# Patient Record
Sex: MALE | Race: WHITE | Employment: OTHER | URBAN - METROPOLITAN AREA
[De-identification: names, ages, dates, MRNs, and addresses within clinical notes are randomized per-mention and may not be internally consistent; named-entity substitution may affect disease eponyms.]

---

## 2018-12-20 ENCOUNTER — TELEPHONE (OUTPATIENT)
Dept: INFECTIOUS DISEASES | Age: 83
End: 2018-12-20

## 2023-02-15 PROBLEM — D64.9 ANEMIA: Status: ACTIVE | Noted: 2023-02-15

## 2023-02-15 PROBLEM — R39.9 UTI SYMPTOMS: Status: ACTIVE | Noted: 2023-02-15

## 2023-02-15 PROBLEM — J20.9 ACUTE BRONCHITIS: Status: ACTIVE | Noted: 2023-02-15

## 2023-02-15 PROBLEM — N52.9 ERECTILE DYSFUNCTION: Status: ACTIVE | Noted: 2023-02-15

## 2023-02-15 PROBLEM — I10 ESSENTIAL HYPERTENSION: Status: ACTIVE | Noted: 2023-02-15

## 2023-02-15 PROBLEM — L23.7 ALLERGIC DERMATITIS DUE TO POISON IVY: Status: ACTIVE | Noted: 2023-02-15

## 2023-02-15 PROBLEM — F43.21 GRIEVING: Status: ACTIVE | Noted: 2023-02-15

## 2023-02-15 PROBLEM — C44.90 SKIN CANCER: Status: ACTIVE | Noted: 2023-02-15

## 2023-02-15 PROBLEM — R21 SKIN RASH: Status: ACTIVE | Noted: 2023-02-15

## 2023-02-15 PROBLEM — L29.9 PRURITUS: Status: ACTIVE | Noted: 2023-02-15

## 2023-02-15 PROBLEM — R53.83 EXHAUSTION: Status: ACTIVE | Noted: 2023-02-15

## 2023-02-15 PROBLEM — R73.03 PREDIABETES: Status: ACTIVE | Noted: 2023-02-15

## 2023-02-15 PROBLEM — R05.9 COUGH: Status: ACTIVE | Noted: 2023-02-15

## 2023-02-15 PROBLEM — I65.22 ARTERIOSCLEROSIS OF CAROTID ARTERY, LEFT: Status: ACTIVE | Noted: 2023-02-15

## 2023-02-15 PROBLEM — I65.22 OCCLUSION AND STENOSIS OF LEFT CAROTID ARTERY: Status: ACTIVE | Noted: 2023-02-15

## 2023-02-15 PROBLEM — E87.5 HYPERKALEMIA: Status: ACTIVE | Noted: 2023-02-15

## 2023-02-15 PROBLEM — E78.5 HYPERLIPIDEMIA: Status: ACTIVE | Noted: 2023-02-15

## 2023-02-15 PROBLEM — U07.1 COVID-19: Status: ACTIVE | Noted: 2023-02-15

## 2023-02-15 PROBLEM — D69.6 THROMBOCYTOPENIA (CMS-HCC): Status: ACTIVE | Noted: 2023-02-15

## 2023-02-15 RX ORDER — HYDROXYZINE HYDROCHLORIDE 10 MG/1
1 TABLET, FILM COATED ORAL 2 TIMES DAILY PRN
COMMUNITY

## 2023-02-15 RX ORDER — CLOPIDOGREL BISULFATE 75 MG/1
1 TABLET ORAL DAILY
COMMUNITY
Start: 2019-05-24 | End: 2023-08-01 | Stop reason: SDUPTHER

## 2023-02-15 RX ORDER — ATORVASTATIN CALCIUM 20 MG/1
1 TABLET, FILM COATED ORAL NIGHTLY
COMMUNITY
Start: 2020-08-28 | End: 2023-10-25 | Stop reason: SDUPTHER

## 2023-02-15 RX ORDER — TRIAMCINOLONE ACETONIDE 1 MG/G
CREAM TOPICAL 2 TIMES DAILY
COMMUNITY

## 2023-02-15 RX ORDER — LISINOPRIL AND HYDROCHLOROTHIAZIDE 12.5; 2 MG/1; MG/1
1 TABLET ORAL DAILY
COMMUNITY
End: 2023-10-27 | Stop reason: SDUPTHER

## 2023-02-15 RX ORDER — NIFEDIPINE 60 MG/1
1 TABLET, FILM COATED, EXTENDED RELEASE ORAL DAILY
COMMUNITY
End: 2023-10-25 | Stop reason: SDUPTHER

## 2023-02-15 RX ORDER — SILDENAFIL 50 MG/1
1 TABLET, FILM COATED ORAL DAILY
COMMUNITY
Start: 2021-10-19

## 2023-03-23 LAB
ALANINE AMINOTRANSFERASE (SGPT) (U/L) IN SER/PLAS: 12 U/L (ref 10–52)
ALBUMIN (G/DL) IN SER/PLAS: 3.9 G/DL (ref 3.4–5)
ALKALINE PHOSPHATASE (U/L) IN SER/PLAS: 171 U/L (ref 33–136)
ANION GAP IN SER/PLAS: 12 MMOL/L (ref 10–20)
ASPARTATE AMINOTRANSFERASE (SGOT) (U/L) IN SER/PLAS: 18 U/L (ref 9–39)
BASOPHILS (10*3/UL) IN BLOOD BY AUTOMATED COUNT: 0.05 X10E9/L (ref 0–0.1)
BASOPHILS/100 LEUKOCYTES IN BLOOD BY AUTOMATED COUNT: 0.5 % (ref 0–2)
BILIRUBIN TOTAL (MG/DL) IN SER/PLAS: 0.5 MG/DL (ref 0–1.2)
CALCIUM (MG/DL) IN SER/PLAS: 9.1 MG/DL (ref 8.6–10.3)
CARBON DIOXIDE, TOTAL (MMOL/L) IN SER/PLAS: 25 MMOL/L (ref 21–32)
CHLORIDE (MMOL/L) IN SER/PLAS: 105 MMOL/L (ref 98–107)
CHOLESTEROL (MG/DL) IN SER/PLAS: 169 MG/DL (ref 0–199)
CHOLESTEROL IN HDL (MG/DL) IN SER/PLAS: 33.9 MG/DL
CHOLESTEROL IN LDL (MG/DL) IN SER/PLAS BY DIRECT ASSAY: 122 MG/DL (ref 0–129)
CHOLESTEROL/HDL RATIO: 5
CREATININE (MG/DL) IN SER/PLAS: 1.32 MG/DL (ref 0.5–1.3)
EOSINOPHILS (10*3/UL) IN BLOOD BY AUTOMATED COUNT: 0.64 X10E9/L (ref 0–0.4)
EOSINOPHILS/100 LEUKOCYTES IN BLOOD BY AUTOMATED COUNT: 7 % (ref 0–6)
ERYTHROCYTE DISTRIBUTION WIDTH (RATIO) BY AUTOMATED COUNT: 14.9 % (ref 11.5–14.5)
ERYTHROCYTE MEAN CORPUSCULAR HEMOGLOBIN CONCENTRATION (G/DL) BY AUTOMATED: 31.5 G/DL (ref 32–36)
ERYTHROCYTE MEAN CORPUSCULAR VOLUME (FL) BY AUTOMATED COUNT: 85 FL (ref 80–100)
ERYTHROCYTES (10*6/UL) IN BLOOD BY AUTOMATED COUNT: 5.05 X10E12/L (ref 4.5–5.9)
GFR MALE: 52 ML/MIN/1.73M2
GLUCOSE (MG/DL) IN SER/PLAS: 112 MG/DL (ref 74–99)
HEMATOCRIT (%) IN BLOOD BY AUTOMATED COUNT: 42.9 % (ref 41–52)
HEMOGLOBIN (G/DL) IN BLOOD: 13.5 G/DL (ref 13.5–17.5)
IMMATURE GRANULOCYTES/100 LEUKOCYTES IN BLOOD BY AUTOMATED COUNT: 0.4 % (ref 0–0.9)
LDL: 107 MG/DL (ref 0–99)
LEUKOCYTES (10*3/UL) IN BLOOD BY AUTOMATED COUNT: 9.1 X10E9/L (ref 4.4–11.3)
LYMPHOCYTES (10*3/UL) IN BLOOD BY AUTOMATED COUNT: 1.29 X10E9/L (ref 0.8–3)
LYMPHOCYTES/100 LEUKOCYTES IN BLOOD BY AUTOMATED COUNT: 14.1 % (ref 13–44)
MONOCYTES (10*3/UL) IN BLOOD BY AUTOMATED COUNT: 0.96 X10E9/L (ref 0.05–0.8)
MONOCYTES/100 LEUKOCYTES IN BLOOD BY AUTOMATED COUNT: 10.5 % (ref 2–10)
NEUTROPHILS (10*3/UL) IN BLOOD BY AUTOMATED COUNT: 6.15 X10E9/L (ref 1.6–5.5)
NEUTROPHILS/100 LEUKOCYTES IN BLOOD BY AUTOMATED COUNT: 67.5 % (ref 40–80)
PLATELETS (10*3/UL) IN BLOOD AUTOMATED COUNT: 223 X10E9/L (ref 150–450)
POTASSIUM (MMOL/L) IN SER/PLAS: 4.4 MMOL/L (ref 3.5–5.3)
PROTEIN TOTAL: 6.6 G/DL (ref 6.4–8.2)
SODIUM (MMOL/L) IN SER/PLAS: 138 MMOL/L (ref 136–145)
TRIGLYCERIDE (MG/DL) IN SER/PLAS: 142 MG/DL (ref 0–149)
UREA NITROGEN (MG/DL) IN SER/PLAS: 21 MG/DL (ref 6–23)
VLDL: 28 MG/DL (ref 0–40)

## 2023-03-29 ENCOUNTER — OFFICE VISIT (OUTPATIENT)
Dept: PRIMARY CARE | Facility: CLINIC | Age: 88
End: 2023-03-29
Payer: MEDICARE

## 2023-03-29 VITALS
HEIGHT: 67 IN | HEART RATE: 68 BPM | WEIGHT: 202 LBS | TEMPERATURE: 98.1 F | BODY MASS INDEX: 31.71 KG/M2 | SYSTOLIC BLOOD PRESSURE: 128 MMHG | DIASTOLIC BLOOD PRESSURE: 60 MMHG

## 2023-03-29 DIAGNOSIS — E78.2 MIXED HYPERLIPIDEMIA: ICD-10-CM

## 2023-03-29 DIAGNOSIS — D69.6 THROMBOCYTOPENIA (CMS-HCC): ICD-10-CM

## 2023-03-29 DIAGNOSIS — I65.22 ARTERIOSCLEROSIS OF CAROTID ARTERY, LEFT: ICD-10-CM

## 2023-03-29 DIAGNOSIS — I10 ESSENTIAL HYPERTENSION: Primary | ICD-10-CM

## 2023-03-29 DIAGNOSIS — D64.9 ANEMIA, UNSPECIFIED TYPE: ICD-10-CM

## 2023-03-29 PROBLEM — I87.2 STASIS DERMATITIS OF BOTH LEGS: Status: ACTIVE | Noted: 2023-03-29

## 2023-03-29 PROBLEM — L23.7 ALLERGIC DERMATITIS DUE TO POISON IVY: Status: RESOLVED | Noted: 2023-02-15 | Resolved: 2023-03-29

## 2023-03-29 PROCEDURE — 99213 OFFICE O/P EST LOW 20 MIN: CPT | Performed by: INTERNAL MEDICINE

## 2023-03-29 PROCEDURE — 3074F SYST BP LT 130 MM HG: CPT | Performed by: INTERNAL MEDICINE

## 2023-03-29 PROCEDURE — 1159F MED LIST DOCD IN RCRD: CPT | Performed by: INTERNAL MEDICINE

## 2023-03-29 PROCEDURE — 1160F RVW MEDS BY RX/DR IN RCRD: CPT | Performed by: INTERNAL MEDICINE

## 2023-03-29 PROCEDURE — 1157F ADVNC CARE PLAN IN RCRD: CPT | Performed by: INTERNAL MEDICINE

## 2023-03-29 PROCEDURE — 1036F TOBACCO NON-USER: CPT | Performed by: INTERNAL MEDICINE

## 2023-03-29 PROCEDURE — 3078F DIAST BP <80 MM HG: CPT | Performed by: INTERNAL MEDICINE

## 2023-03-29 ASSESSMENT — PATIENT HEALTH QUESTIONNAIRE - PHQ9
1. LITTLE INTEREST OR PLEASURE IN DOING THINGS: NOT AT ALL
2. FEELING DOWN, DEPRESSED OR HOPELESS: NOT AT ALL
SUM OF ALL RESPONSES TO PHQ9 QUESTIONS 1 AND 2: 0

## 2023-03-29 ASSESSMENT — ENCOUNTER SYMPTOMS
CARDIOVASCULAR NEGATIVE: 1
LOSS OF SENSATION IN FEET: 0
CONSTITUTIONAL NEGATIVE: 1
RESPIRATORY NEGATIVE: 1
PSYCHIATRIC NEGATIVE: 1
OCCASIONAL FEELINGS OF UNSTEADINESS: 0
HEMATOLOGIC/LYMPHATIC NEGATIVE: 1
NEUROLOGICAL NEGATIVE: 1
DEPRESSION: 0

## 2023-03-29 NOTE — PROGRESS NOTES
"Subjective   Patient ID: Jonathan Lira is a 88 y.o. male who presents for Follow-up (Patient here for office visit and lab results).    Patient here for follow-up his redness in his legs is improved with topical steroids.  He does have stasis dermatitis from chronic venous stasis.         Review of Systems   Constitutional: Negative.    HENT: Negative.     Respiratory: Negative.     Cardiovascular: Negative.    Genitourinary: Negative.    Neurological: Negative.    Hematological: Negative.    Psychiatric/Behavioral: Negative.     All other systems reviewed and are negative.      Objective   /60   Pulse 68   Temp 36.7 °C (98.1 °F) (Temporal)   Ht 1.702 m (5' 7\")   Wt 91.6 kg (202 lb)   BMI 31.64 kg/m²     Physical Exam  Vitals reviewed.   Constitutional:       Appearance: Normal appearance.   HENT:      Head: Normocephalic and atraumatic.   Eyes:      Conjunctiva/sclera: Conjunctivae normal.      Pupils: Pupils are equal, round, and reactive to light.   Cardiovascular:      Rate and Rhythm: Normal rate and regular rhythm.      Heart sounds: Normal heart sounds.   Pulmonary:      Effort: Pulmonary effort is normal.      Breath sounds: Normal breath sounds.   Abdominal:      Palpations: Abdomen is soft.   Musculoskeletal:      Right lower leg: No edema.   Skin:     Findings: Erythema and rash present.   Neurological:      General: No focal deficit present.      Mental Status: He is alert. Mental status is at baseline.   Psychiatric:         Mood and Affect: Mood normal.         Thought Content: Thought content normal.         Assessment/Plan   Problem List Items Addressed This Visit          Circulatory    Arteriosclerosis of carotid artery, left    Essential hypertension - Primary     Continue DASH diet.  Patient physically very active.  He will continue lisinopril hydrochlorothiazide and nifedipine for hypertension.            Hematologic    Anemia    Thrombocytopenia (CMS/[facility])     Platelet count will be " closely monitored he does not have any bleeding or bruising issues.            Other    Hyperlipidemia     Patient will continue lisinopril hydrochlorothiazide and nifedipine for hypertension follow DASH diet.  Increase physical activity even though he is very active even at age 88

## 2023-03-30 NOTE — ASSESSMENT & PLAN NOTE
Patient will continue lisinopril hydrochlorothiazide and nifedipine for hypertension follow DASH diet.  Increase physical activity even though he is very active even at age 88

## 2023-03-30 NOTE — ASSESSMENT & PLAN NOTE
Continue DASH diet.  Patient physically very active.  He will continue lisinopril hydrochlorothiazide and nifedipine for hypertension.

## 2023-05-22 ENCOUNTER — APPOINTMENT (OUTPATIENT)
Dept: PRIMARY CARE | Facility: CLINIC | Age: 88
End: 2023-05-22
Payer: MEDICARE

## 2023-07-06 ENCOUNTER — OFFICE VISIT (OUTPATIENT)
Dept: PRIMARY CARE | Facility: CLINIC | Age: 88
End: 2023-07-06
Payer: MEDICARE

## 2023-07-06 VITALS
BODY MASS INDEX: 28.28 KG/M2 | WEIGHT: 202 LBS | HEART RATE: 64 BPM | TEMPERATURE: 98.3 F | DIASTOLIC BLOOD PRESSURE: 80 MMHG | HEIGHT: 71 IN | SYSTOLIC BLOOD PRESSURE: 146 MMHG

## 2023-07-06 DIAGNOSIS — C44.309 SKIN CANCER OF FOREHEAD: ICD-10-CM

## 2023-07-06 DIAGNOSIS — I65.22 ARTERIOSCLEROSIS OF CAROTID ARTERY, LEFT: ICD-10-CM

## 2023-07-06 DIAGNOSIS — I10 ESSENTIAL HYPERTENSION: Primary | ICD-10-CM

## 2023-07-06 DIAGNOSIS — E78.2 MIXED HYPERLIPIDEMIA: ICD-10-CM

## 2023-07-06 PROCEDURE — 3079F DIAST BP 80-89 MM HG: CPT | Performed by: INTERNAL MEDICINE

## 2023-07-06 PROCEDURE — 1036F TOBACCO NON-USER: CPT | Performed by: INTERNAL MEDICINE

## 2023-07-06 PROCEDURE — 1159F MED LIST DOCD IN RCRD: CPT | Performed by: INTERNAL MEDICINE

## 2023-07-06 PROCEDURE — 99213 OFFICE O/P EST LOW 20 MIN: CPT | Performed by: INTERNAL MEDICINE

## 2023-07-06 PROCEDURE — 3077F SYST BP >= 140 MM HG: CPT | Performed by: INTERNAL MEDICINE

## 2023-07-06 PROCEDURE — 1157F ADVNC CARE PLAN IN RCRD: CPT | Performed by: INTERNAL MEDICINE

## 2023-07-06 PROCEDURE — 1160F RVW MEDS BY RX/DR IN RCRD: CPT | Performed by: INTERNAL MEDICINE

## 2023-07-06 ASSESSMENT — ENCOUNTER SYMPTOMS
PSYCHIATRIC NEGATIVE: 1
RESPIRATORY NEGATIVE: 1
CARDIOVASCULAR NEGATIVE: 1
ENDOCRINE NEGATIVE: 1
MUSCULOSKELETAL NEGATIVE: 1
NEUROLOGICAL NEGATIVE: 1
HEMATOLOGIC/LYMPHATIC NEGATIVE: 1
EYES NEGATIVE: 1

## 2023-07-06 NOTE — PROGRESS NOTES
"Subjective   Patient ID: Jonathan Lira is a 88 y.o. male who presents for Follow-up (Pt here for OV).    HPI patient here for office visit he has history of hypertension and carotid stenosis he has history of stroke he is on Plavix and Lipitor he is feeling well and is not experiencing any active symptoms or complaints    Review of Systems   HENT: Negative.     Eyes: Negative.    Respiratory: Negative.     Cardiovascular: Negative.    Endocrine: Negative.    Genitourinary: Negative.    Musculoskeletal: Negative.    Neurological: Negative.    Hematological: Negative.    Psychiatric/Behavioral: Negative.     All other systems reviewed and are negative.      Objective   /80   Pulse 64   Temp 36.8 °C (98.3 °F) (Temporal)   Ht 1.803 m (5' 11\")   Wt 91.6 kg (202 lb)   BMI 28.17 kg/m²     Physical Exam  Vitals reviewed.   Constitutional:       Appearance: Normal appearance.   Eyes:      Conjunctiva/sclera: Conjunctivae normal.   Cardiovascular:      Rate and Rhythm: Normal rate and regular rhythm.      Heart sounds: Normal heart sounds.   Abdominal:      Palpations: Abdomen is soft.   Neurological:      Mental Status: He is alert.   Psychiatric:         Mood and Affect: Mood normal.         Behavior: Behavior normal.         Thought Content: Thought content normal.       Assessment/Plan   Problem List Items Addressed This Visit       Arteriosclerosis of carotid artery, left    Essential hypertension - Primary    Hyperlipidemia    Skin cancer of forehead        Patient will continue to follow-up with the dermatology he has history of skin cancer and Mohs surgery for back on more than one occasion he is on 5-fluorouracil topical cream.  He will continue lisinopril hydrochlorothiazide and nifedipine for hypertension he has history of hyperlipidemia continue statins he has history of prediabetes health continue lifestyle modification and diet  "

## 2023-07-12 ENCOUNTER — NURSING HOME VISIT (OUTPATIENT)
Dept: POST ACUTE CARE | Facility: EXTERNAL LOCATION | Age: 88
End: 2023-07-12
Payer: MEDICARE

## 2023-07-12 DIAGNOSIS — I65.22 OCCLUSION AND STENOSIS OF LEFT CAROTID ARTERY: ICD-10-CM

## 2023-07-12 DIAGNOSIS — R47.9 SPEECH DISTURBANCE, UNSPECIFIED TYPE: ICD-10-CM

## 2023-07-12 DIAGNOSIS — I10 ESSENTIAL HYPERTENSION: Primary | ICD-10-CM

## 2023-07-12 DIAGNOSIS — E78.2 MIXED HYPERLIPIDEMIA: ICD-10-CM

## 2023-07-12 DIAGNOSIS — G89.29 CHRONIC PAIN OF RIGHT KNEE: ICD-10-CM

## 2023-07-12 DIAGNOSIS — M25.561 CHRONIC PAIN OF RIGHT KNEE: ICD-10-CM

## 2023-07-12 PROCEDURE — 99306 1ST NF CARE HIGH MDM 50: CPT | Performed by: INTERNAL MEDICINE

## 2023-07-12 NOTE — LETTER
Patient: Jonathan Lira  : 1934    Encounter Date: 2023    Subjective  Patient ID: Jonathan Lira is a 88 y.o. male who is acute skilled care and presents for initial visit for skilled nursing.    88-year-old male patient was admitted because he was having transient speech disturbance, he was admitted because of strokelike symptoms as they do it normally nowadays through emergency room.  Work-up was negative, MRI was negative, previously it has been documented that patient has a some disease in the left carotid artery system.  It is also seen that patient has a some neurodegenerative disorder or parkinsonism features.  Patient was kept in the hospital, MRI was done, nothing was obvious, admitted here for skilled nursing and rehabilitation.  Patient was assessed on the day of assessment and evaluation.  He was sitting comfortably, overnight was uneventful, patient has ambulatory care records and previous evaluations and reports were reviewed.  He has excellent family support, patient will require skilled nursing and rehabilitation.         Review of Systems   Constitutional:  Positive for activity change and fatigue. Negative for diaphoresis.   HENT: Negative.  Negative for congestion and voice change.    Eyes: Negative.  Negative for visual disturbance.   Respiratory: Negative.     Cardiovascular: Negative.  Negative for chest pain, palpitations and leg swelling.   Gastrointestinal: Negative.  Negative for abdominal distention, constipation and diarrhea.   Genitourinary:  Negative for difficulty urinating and hematuria.   Musculoskeletal:  Positive for arthralgias, gait problem and joint swelling.   Skin: Negative.    Neurological:  Positive for speech difficulty and weakness. Negative for dizziness and tremors.   Psychiatric/Behavioral:  Negative for agitation, hallucinations and sleep disturbance.        Objective  /79   Pulse 88     Physical Exam  Constitutional:       Appearance: Normal appearance.  He is normal weight.   HENT:      Head: Normocephalic.      Nose: Nose normal.   Eyes:      Conjunctiva/sclera: Conjunctivae normal.   Cardiovascular:      Rate and Rhythm: Normal rate and regular rhythm.      Pulses: Normal pulses.      Heart sounds: Normal heart sounds.   Pulmonary:      Effort: Pulmonary effort is normal.      Breath sounds: Normal breath sounds.   Abdominal:      General: Abdomen is flat.      Palpations: Abdomen is soft.   Musculoskeletal:         General: Swelling, tenderness and deformity present.      Cervical back: Neck supple.   Skin:     General: Skin is warm and dry.   Neurological:      Mental Status: He is oriented to person, place, and time. Mental status is at baseline.   Psychiatric:         Mood and Affect: Mood normal.         Judgment: Judgment normal.         Assessment/Plan  Problem List Items Addressed This Visit       Essential hypertension - Primary    Hyperlipidemia    Occlusion and stenosis of left carotid artery    Speech disturbance    Chronic pain of right knee   Patient is listed to be on atorvastatin, lisinopril, Nifedical, clopidogrel.  He has been having mild symptoms of cough congestion, he is able to communicate properly, he is able to answer my questions, his speech is not completely normal but it has some substance and fluency.  There is no obvious neurological deficits found.  Remains on scant therapeutics and also only on clopidogrel.  He looks well, physical therapy, Occupational Therapy evaluation will be done, routine care precautions, fall precautions, fulfillment of patient's needs and requirement on a periodic basis and also proper support and assistance will be provided.  Patient is looking well, no obvious evidence of neurological deficit or cognitive impairment, BP readings will be monitored, laboratories will be done as per our routine, at the discharge patient's laboratories were reviewed, discussed with nursing staff and periodic follow-up will be  done.     Goals    None           Electronically Signed By: Scott Junior MD   7/16/23  1:57 PM

## 2023-07-14 ENCOUNTER — NURSING HOME VISIT (OUTPATIENT)
Dept: POST ACUTE CARE | Facility: EXTERNAL LOCATION | Age: 88
End: 2023-07-14
Payer: MEDICARE

## 2023-07-14 VITALS
HEIGHT: 71 IN | RESPIRATION RATE: 18 BRPM | DIASTOLIC BLOOD PRESSURE: 78 MMHG | WEIGHT: 199 LBS | HEART RATE: 78 BPM | OXYGEN SATURATION: 96 % | BODY MASS INDEX: 27.86 KG/M2 | TEMPERATURE: 97.6 F | SYSTOLIC BLOOD PRESSURE: 138 MMHG

## 2023-07-14 DIAGNOSIS — I10 ESSENTIAL HYPERTENSION: ICD-10-CM

## 2023-07-14 DIAGNOSIS — U07.1 COVID-19: Primary | ICD-10-CM

## 2023-07-14 DIAGNOSIS — R47.81 SLURRED SPEECH: ICD-10-CM

## 2023-07-14 DIAGNOSIS — G89.29 CHRONIC PAIN OF RIGHT KNEE: ICD-10-CM

## 2023-07-14 DIAGNOSIS — Z74.09 IMPAIRED FUNCTIONAL MOBILITY, BALANCE, GAIT, AND ENDURANCE: ICD-10-CM

## 2023-07-14 DIAGNOSIS — M25.561 CHRONIC PAIN OF RIGHT KNEE: ICD-10-CM

## 2023-07-14 PROCEDURE — 99310 SBSQ NF CARE HIGH MDM 45: CPT | Performed by: PHYSICIAN ASSISTANT

## 2023-07-14 NOTE — LETTER
Patient: Jonathan Lira  : 1934    Encounter Date: 2023  Name: Jonathan Lira  YOB: 1934    Chief complaint: Progressive weakness. Inability to ambulate.    HPI: This is a 88 year old  male who has a medical history remarkable for CVA, remote history for Parkinson's, generalized anxiety disorder, gout, diabetes, hyperlipidemia, carotid artery stenosis, stage 3 CKD, and DVT. Patient was brought to the ER for evaluation of episode of slurred speech that  occurred 4 days earlier, lasted approximately 45 minutes and resolved spontaneously. Patient did not seek evaluation at that time but because of progressive weakness and inability to stand, his son insisted he go to the ER for help. EKG showed sinus rhythm with RBBB. Lab work was significant for a WBC of 15.1. CT of brain negative acute intracranial abnormality or calvarial fracture. MRI showed no evidence of intercranial hemorrhage. No mass effect or mid line shift. Patient was mildly dehydrated which improved with hydration. He was seen by neurology service who felt patient could be seen in outpatient follow up. Patient was discharged to SNF for rehab.    Today patient seen in his room. COVID screen positive. He has been placed on quarantine. He also has R knee swelling. Ortho consult has been requested.      Extremity Weakness  This is a new problem. The current episode started in the past 7 days. The problem occurs daily. The problem has been gradually improving. Associated symptoms include arthralgias and joint swelling. Pertinent negatives include no anorexia, chest pain, chills, congestion, fever, headaches, numbness or urinary symptoms. The symptoms are aggravated by standing. He has tried acetaminophen and rest for the symptoms. The treatment provided mild relief.     Review of systems:   ROS negative except were noted in HPI.    Code Status: full code    /78   Pulse 78   Temp 36.4 °C (97.6 °F)   Resp 18    "Ht 1.803 m (5' 11\")   Wt 90.3 kg (199 lb)   SpO2 96%   BMI 27.75 kg/m²      Physical Exam  Constitutional:       General: He is not in acute distress.     Appearance: Normal appearance.   HENT:      Head: Normocephalic.      Nose: Nose normal. No congestion.      Mouth/Throat:      Mouth: Mucous membranes are moist.      Pharynx: Oropharynx is clear.   Eyes:      Extraocular Movements: Extraocular movements intact.      Pupils: Pupils are equal, round, and reactive to light.   Cardiovascular:      Rate and Rhythm: Normal rate and regular rhythm.      Pulses: Normal pulses.      Heart sounds: Normal heart sounds.   Pulmonary:      Effort: Pulmonary effort is normal.      Breath sounds: Normal breath sounds. No wheezing or rales.   Genitourinary:     Comments: Voiding.  Musculoskeletal:      Cervical back: Normal range of motion.      Comments: R knee swelling.    Lymphadenopathy:      Cervical: No cervical adenopathy.   Skin:     General: Skin is warm and dry.      Capillary Refill: Capillary refill takes less than 2 seconds.   Neurological:      General: No focal deficit present.      Mental Status: He is alert and oriented to person, place, and time.   Psychiatric:         Mood and Affect: Mood normal.         Behavior: Behavior normal.        Medications reviewed during visit at facility.  Lisinopril/hydrochlorothiazide 20/12.5 mg po daily  Plavix 75 mg po daily  Nifedical XL 60 mg po daily  Atorvastatin 20 mg po daily  Tylenol 650 mg po q 4 hours prn  MOM 30 ml po daily prn    Labs reviewed at facility:     Laboratory Service Report [id number]   Patient Name   JONA RICHMOND  Patient ID   [id number]  Age   88 Y  Gender   M  Order #      Ordering RAJ Lee  Patient Telephone #   N/A     1934  AKA      Client Order #   [id number]   Collection Date and Time   2023 07:55   Print Date and Time   2023 22:00  Account Information   [facility] [place]   [address] [place] [address]     Report Notes                        Test Results   Reference Perform  Unit  Value Site*             CBC and Differential  REPORTED 07/14/2023 11:12  White Blood Cell Count   9.98 k/uL 3.70-11.00    RBC   5.20 m/uL 4.20-6.00    Hemoglobin   13.5 g/dL 13.0-17.0    Hematocrit   42.2 % 39.0-51.0    MCV   81.2 fL 80.0-100.0    MCH   26.0 pg 26.0-34.0    MCHC   32.0 g/dL 30.5-36.0    RDW-CV   15.0 % 11.5-15.0    Platelet Count   227 k/uL 150-400    MPV   12.4 fL 9.0-12.7    Neut%   79.3 %    Abs Neut H 7.91 k/uL 1.45-7.50    Lymph%   8.6 %    Abs Lymph L 0.86 k/uL 1.00-4.00    Mono%   10.8 %    Abs Mono H 1.08 k/uL <0.87    Eosin%   0.4 %    Abs Eosin   0.04 k/uL <0.46    Baso%   0.2 %    Abs Baso   <0.03 k/uL <0.11    Immature Gran %%   0.7 %    Abs Immature Gran   0.07 k/uL <0.10    Absolute nRBC   0.0 /100 WBC    Absolute nRBC   <0.01 k/uL <0.01    Diff Type   Auto               Comp Metabolic Panel  REPORTED 07/14/2023 11:40  Protein, Total   6.7 g/dL 6.3-8.0    Albumin L 3.5 g/dL 3.9-4.9    Calcium, Total   9.2 mg/dL 8.5-10.2    Bilirubin, Total   0.7 mg/dL 0.2-1.3    Alkaline Phosphatase H 143 U/L     AST   21 U/L 14-40    ALT   13 U/L 10-54    Glucose   95 mg/dL 74-99  BUN H 38 mg/dL 9-24    Creatinine H 1.46 mg/dL 0.73-1.22    Sodium L 131 mmol/L 136-144    Potassium L 3.5 mmol/L 3.7-5.1    Chloride L 94 mmol/L     CO2 L 21 mmol/L 22-30    Anion Gap   16 mmol/L 9-18    Estimated Glomerular Filtration Rate L 46 mL/min/1.73 meters squared >=60   Assessment/Plan   Problem List Items Addressed This Visit       COVID-19 - Primary     Place in quarantine. Droplet precautions maintained during all encounters while in COVID isolation. All services to be provided in room. Monitor oxygen needs.         Essential hypertension     Review BP readings. Continue with Lisinopril/hydrochlorothiazide 20/12.5 mg po daily. Nifedical XL 60 mg po daily         Speech disturbance     Schedule  appointment with  Dr. Garsia - neuro - 8/14/23         Chronic pain of right knee     Schedule appointment with Dr. Shalini BENITEZ on 8-10-23.          Impaired functional mobility, balance, gait, and endurance     Order PT and OT to assess and treat.            Time:  I spent 45 minutes or greater with the patient. Greater than 50% of this time was spent in counseling and or coordination of care. The time includes prep time of reviewing vital signs, report from direct nursing staff and or therapists, hospital documentation, reviewing labs, radiographs, diagnostic tests and or consultations, time directly spent with the patient interviewing, examining, and education regarding diagnosis, treatments, and medications, as well as documentation in the electronic medical record, and reviewing the plan of care and any new orders with the patient, nursing staff and other staff directly related to the patients care.      Yogesh Perez PA-C       Electronically Signed By: Yogesh Perez PA-C   7/16/23  5:24 PM

## 2023-07-15 NOTE — PROGRESS NOTES
"7/14/2023  Name: Jonathan Lira  YOB: 1934    Chief complaint: Progressive weakness. Inability to ambulate.    HPI: This is a 88 year old  male who has a medical history remarkable for CVA, remote history for Parkinson's, generalized anxiety disorder, gout, diabetes, hyperlipidemia, carotid artery stenosis, stage 3 CKD, and DVT. Patient was brought to the ER for evaluation of episode of slurred speech that  occurred 4 days earlier, lasted approximately 45 minutes and resolved spontaneously. Patient did not seek evaluation at that time but because of progressive weakness and inability to stand, his son insisted he go to the ER for help. EKG showed sinus rhythm with RBBB. Lab work was significant for a WBC of 15.1. CT of brain negative acute intracranial abnormality or calvarial fracture. MRI showed no evidence of intercranial hemorrhage. No mass effect or mid line shift. Patient was mildly dehydrated which improved with hydration. He was seen by neurology service who felt patient could be seen in outpatient follow up. Patient was discharged to SNF for rehab.    Today patient seen in his room. COVID screen positive. He has been placed on quarantine. He also has R knee swelling. Ortho consult has been requested.      Extremity Weakness  This is a new problem. The current episode started in the past 7 days. The problem occurs daily. The problem has been gradually improving. Associated symptoms include arthralgias and joint swelling. Pertinent negatives include no anorexia, chest pain, chills, congestion, fever, headaches, numbness or urinary symptoms. The symptoms are aggravated by standing. He has tried acetaminophen and rest for the symptoms. The treatment provided mild relief.     Review of systems:   ROS negative except were noted in HPI.    Code Status: full code    /78   Pulse 78   Temp 36.4 °C (97.6 °F)   Resp 18   Ht 1.803 m (5' 11\")   Wt 90.3 kg (199 lb)   SpO2 96%   BMI 27.75 " kg/m²      Physical Exam  Constitutional:       General: He is not in acute distress.     Appearance: Normal appearance.   HENT:      Head: Normocephalic.      Nose: Nose normal. No congestion.      Mouth/Throat:      Mouth: Mucous membranes are moist.      Pharynx: Oropharynx is clear.   Eyes:      Extraocular Movements: Extraocular movements intact.      Pupils: Pupils are equal, round, and reactive to light.   Cardiovascular:      Rate and Rhythm: Normal rate and regular rhythm.      Pulses: Normal pulses.      Heart sounds: Normal heart sounds.   Pulmonary:      Effort: Pulmonary effort is normal.      Breath sounds: Normal breath sounds. No wheezing or rales.   Genitourinary:     Comments: Voiding.  Musculoskeletal:      Cervical back: Normal range of motion.      Comments: R knee swelling.    Lymphadenopathy:      Cervical: No cervical adenopathy.   Skin:     General: Skin is warm and dry.      Capillary Refill: Capillary refill takes less than 2 seconds.   Neurological:      General: No focal deficit present.      Mental Status: He is alert and oriented to person, place, and time.   Psychiatric:         Mood and Affect: Mood normal.         Behavior: Behavior normal.        Medications reviewed during visit at facility.  Lisinopril/hydrochlorothiazide 20/12.5 mg po daily  Plavix 75 mg po daily  Nifedical XL 60 mg po daily  Atorvastatin 20 mg po daily  Tylenol 650 mg po q 4 hours prn  MOM 30 ml po daily prn    Labs reviewed at facility:     Laboratory Service Report [id number]   Patient Name   JOAN RICHMOND  Patient ID   [id number]  Age   88 Y  Gender   M  Order #      Ordering Phys   RAJ ONTIVEROS  Patient Telephone #   N/A     1934  AKA      Client Order #   [id number]   Collection Date and Time   2023 07:55   Print Date and Time   2023 22:00  Account Information   [facility] [place]   [address] [place] [address]     Report Notes                         Test Results   Reference Perform  Unit  Value Site*             CBC and Differential  REPORTED 07/14/2023 11:12  White Blood Cell Count   9.98 k/uL 3.70-11.00    RBC   5.20 m/uL 4.20-6.00    Hemoglobin   13.5 g/dL 13.0-17.0    Hematocrit   42.2 % 39.0-51.0    MCV   81.2 fL 80.0-100.0    MCH   26.0 pg 26.0-34.0    MCHC   32.0 g/dL 30.5-36.0    RDW-CV   15.0 % 11.5-15.0    Platelet Count   227 k/uL 150-400    MPV   12.4 fL 9.0-12.7    Neut%   79.3 %    Abs Neut H 7.91 k/uL 1.45-7.50    Lymph%   8.6 %    Abs Lymph L 0.86 k/uL 1.00-4.00    Mono%   10.8 %    Abs Mono H 1.08 k/uL <0.87    Eosin%   0.4 %    Abs Eosin   0.04 k/uL <0.46    Baso%   0.2 %    Abs Baso   <0.03 k/uL <0.11    Immature Gran %%   0.7 %    Abs Immature Gran   0.07 k/uL <0.10    Absolute nRBC   0.0 /100 WBC    Absolute nRBC   <0.01 k/uL <0.01    Diff Type   Auto               Comp Metabolic Panel  REPORTED 07/14/2023 11:40  Protein, Total   6.7 g/dL 6.3-8.0    Albumin L 3.5 g/dL 3.9-4.9    Calcium, Total   9.2 mg/dL 8.5-10.2    Bilirubin, Total   0.7 mg/dL 0.2-1.3    Alkaline Phosphatase H 143 U/L     AST   21 U/L 14-40    ALT   13 U/L 10-54    Glucose   95 mg/dL 74-99  BUN H 38 mg/dL 9-24    Creatinine H 1.46 mg/dL 0.73-1.22    Sodium L 131 mmol/L 136-144    Potassium L 3.5 mmol/L 3.7-5.1    Chloride L 94 mmol/L     CO2 L 21 mmol/L 22-30    Anion Gap   16 mmol/L 9-18    Estimated Glomerular Filtration Rate L 46 mL/min/1.73 meters squared >=60   Assessment/Plan    Problem List Items Addressed This Visit       COVID-19 - Primary     Place in quarantine. Droplet precautions maintained during all encounters while in COVID isolation. All services to be provided in room. Monitor oxygen needs.         Essential hypertension     Review BP readings. Continue with Lisinopril/hydrochlorothiazide 20/12.5 mg po daily. Nifedical XL 60 mg po daily         Speech disturbance     Schedule appointment with  Dr. Garsia - neuro - 8/14/23         Chronic  pain of right knee     Schedule appointment with Dr. Shalini BENITEZ on 8-10-23.          Impaired functional mobility, balance, gait, and endurance     Order PT and OT to assess and treat.            Time:  I spent 45 minutes or greater with the patient. Greater than 50% of this time was spent in counseling and or coordination of care. The time includes prep time of reviewing vital signs, report from direct nursing staff and or therapists, hospital documentation, reviewing labs, radiographs, diagnostic tests and or consultations, time directly spent with the patient interviewing, examining, and education regarding diagnosis, treatments, and medications, as well as documentation in the electronic medical record, and reviewing the plan of care and any new orders with the patient, nursing staff and other staff directly related to the patients care.      Yogesh Perez PA-C

## 2023-07-16 ENCOUNTER — NURSING HOME VISIT (OUTPATIENT)
Dept: POST ACUTE CARE | Facility: EXTERNAL LOCATION | Age: 88
End: 2023-07-16
Payer: MEDICARE

## 2023-07-16 VITALS — SYSTOLIC BLOOD PRESSURE: 134 MMHG | HEART RATE: 88 BPM | DIASTOLIC BLOOD PRESSURE: 79 MMHG

## 2023-07-16 VITALS — HEART RATE: 88 BPM | DIASTOLIC BLOOD PRESSURE: 84 MMHG | SYSTOLIC BLOOD PRESSURE: 133 MMHG

## 2023-07-16 DIAGNOSIS — M25.561 CHRONIC PAIN OF RIGHT KNEE: ICD-10-CM

## 2023-07-16 DIAGNOSIS — G89.29 CHRONIC PAIN OF RIGHT KNEE: ICD-10-CM

## 2023-07-16 DIAGNOSIS — U07.1 COVID-19: Primary | ICD-10-CM

## 2023-07-16 PROBLEM — Z74.09 IMPAIRED FUNCTIONAL MOBILITY, BALANCE, GAIT, AND ENDURANCE: Status: ACTIVE | Noted: 2023-07-16

## 2023-07-16 PROBLEM — I63.81 OTHER CEREBRAL INFARCTION DUE TO OCCLUSION OR STENOSIS OF SMALL ARTERY (MULTI): Status: ACTIVE | Noted: 2023-07-16

## 2023-07-16 PROBLEM — R47.9 SPEECH DISTURBANCE: Status: ACTIVE | Noted: 2023-07-16

## 2023-07-16 PROCEDURE — 99309 SBSQ NF CARE MODERATE MDM 30: CPT | Performed by: INTERNAL MEDICINE

## 2023-07-16 ASSESSMENT — ENCOUNTER SYMPTOMS
EXTREMITY WEAKNESS: 1
SPEECH DIFFICULTY: 1
RESPIRATORY NEGATIVE: 1
CARDIOVASCULAR NEGATIVE: 1
SHORTNESS OF BREATH: 0
DIAPHORESIS: 0
PALPITATIONS: 0
DIFFICULTY URINATING: 0
ACTIVITY CHANGE: 1
WEAKNESS: 1
GASTROINTESTINAL NEGATIVE: 1
CARDIOVASCULAR NEGATIVE: 1
FATIGUE: 1
JOINT SWELLING: 1
ANOREXIA: 0
SPEECH DIFFICULTY: 0
AGITATION: 0
ACTIVITY CHANGE: 1
JOINT SWELLING: 1
SINUS PRESSURE: 0
NUMBNESS: 0
CONSTIPATION: 0
FATIGUE: 1
GASTROINTESTINAL NEGATIVE: 1
JOINT SWELLING: 1
SORE THROAT: 1
ARTHRALGIAS: 1
CHILLS: 0
DIZZINESS: 0
ABDOMINAL DISTENTION: 0
APPETITE CHANGE: 1
EYES NEGATIVE: 1
HEADACHES: 0
HALLUCINATIONS: 0
RESPIRATORY NEGATIVE: 1
FEVER: 0
DIZZINESS: 0
SLEEP DISTURBANCE: 0
VOICE CHANGE: 0
HEMATURIA: 0
EYES NEGATIVE: 1
ARTHRALGIAS: 1
ARTHRALGIAS: 1
TREMORS: 0
DIARRHEA: 0

## 2023-07-16 NOTE — ASSESSMENT & PLAN NOTE
Review BP readings. Continue with Lisinopril/hydrochlorothiazide 20/12.5 mg po daily. Nifedical XL 60 mg po daily

## 2023-07-16 NOTE — PROGRESS NOTES
Subjective   Patient ID: Jonathan Lira is a 88 y.o. male who is acute skilled care being seen and evaluated for multiple medical problems.    After I saw this patient last time I noticed that he was having pain and swelling of right knee, I talked with the orthopedic physician and we were trying to figure out that patient can be seen by orthopedics last Friday so that plain radiograph can be done and the joint fluid can be drained because right knee is deformed, it is difficult for him to do weightbearing but then Thursday they called me that patient has a COVID-19 and actually patient was seen by me day before, so COVID-19 was done because patient was having exposure to COVID-19 related use, he has a very mild symptoms of respiratory tract infection.  After having COVID-19 we canceled orthopedic appointment, today I saw this patient, there was no family member at bedside, patient's saturations are adequate, patient did not have any fever chills sweats, patient did not have any lightheadedness, very mild symptoms of COVID-19 without any compromise.         Review of Systems   Constitutional:  Positive for activity change, appetite change and fatigue.   HENT:  Positive for congestion and sore throat. Negative for sinus pressure.    Eyes: Negative.    Respiratory: Negative.  Negative for shortness of breath.    Cardiovascular: Negative.  Negative for leg swelling.   Gastrointestinal: Negative.    Musculoskeletal:  Positive for arthralgias, gait problem and joint swelling.   Skin: Negative.    Neurological:  Negative for dizziness and speech difficulty.       Objective   /84   Pulse 88     Physical Exam  Constitutional:       Appearance: Normal appearance. He is normal weight.   HENT:      Head: Normocephalic.      Nose: Nose normal.   Eyes:      Conjunctiva/sclera: Conjunctivae normal.   Cardiovascular:      Rate and Rhythm: Normal rate and regular rhythm.      Pulses: Normal pulses.      Heart sounds: Normal  heart sounds.   Pulmonary:      Effort: Pulmonary effort is normal.      Breath sounds: Normal breath sounds.   Abdominal:      General: Abdomen is flat.      Palpations: Abdomen is soft.   Musculoskeletal:         General: Swelling, tenderness and deformity present.      Cervical back: Neck supple.   Skin:     General: Skin is warm and dry.   Neurological:      Mental Status: He is oriented to person, place, and time  Assessment/Plan   Problem List Items Addressed This Visit       COVID-19 - Primary    Chronic pain of right knee   So we will hold off orthopedic evaluation, COVID-19 is in a milder formulation.  We did not give Paxlovid, patient has been kept on isolation.  Later on we will consider plain radiograph and orthopedic evaluation may be couple of weeks later.  Still right joint remains slightly swollen although not extremely tender.  COVID-19 is not very usual in a nursing facility is nowadays so it is a exposure either during hospitalization or from the relatives it is difficult to say.  No need to date take any extra interventions, he is doing well and he will be kept on isolation as per routine.     Goals    None

## 2023-07-16 NOTE — LETTER
Patient: Jonathan Lira  : 1934    Encounter Date: 2023    Subjective  Patient ID: Jonathan Lira is a 88 y.o. male who is acute skilled care being seen and evaluated for multiple medical problems.    After I saw this patient last time I noticed that he was having pain and swelling of right knee, I talked with the orthopedic physician and we were trying to figure out that patient can be seen by orthopedics last Friday so that plain radiograph can be done and the joint fluid can be drained because right knee is deformed, it is difficult for him to do weightbearing but then Thursday they called me that patient has a COVID-19 and actually patient was seen by me day before, so COVID-19 was done because patient was having exposure to COVID-19 related use, he has a very mild symptoms of respiratory tract infection.  After having COVID-19 we canceled orthopedic appointment, today I saw this patient, there was no family member at bedside, patient's saturations are adequate, patient did not have any fever chills sweats, patient did not have any lightheadedness, very mild symptoms of COVID-19 without any compromise.         Review of Systems   Constitutional:  Positive for activity change, appetite change and fatigue.   HENT:  Positive for congestion and sore throat. Negative for sinus pressure.    Eyes: Negative.    Respiratory: Negative.  Negative for shortness of breath.    Cardiovascular: Negative.  Negative for leg swelling.   Gastrointestinal: Negative.    Musculoskeletal:  Positive for arthralgias, gait problem and joint swelling.   Skin: Negative.    Neurological:  Negative for dizziness and speech difficulty.       Objective  /84   Pulse 88     Physical Exam  Constitutional:       Appearance: Normal appearance. He is normal weight.   HENT:      Head: Normocephalic.      Nose: Nose normal.   Eyes:      Conjunctiva/sclera: Conjunctivae normal.   Cardiovascular:      Rate and Rhythm: Normal rate and  regular rhythm.      Pulses: Normal pulses.      Heart sounds: Normal heart sounds.   Pulmonary:      Effort: Pulmonary effort is normal.      Breath sounds: Normal breath sounds.   Abdominal:      General: Abdomen is flat.      Palpations: Abdomen is soft.   Musculoskeletal:         General: Swelling, tenderness and deformity present.      Cervical back: Neck supple.   Skin:     General: Skin is warm and dry.   Neurological:      Mental Status: He is oriented to person, place, and time  Assessment/Plan  Problem List Items Addressed This Visit       COVID-19 - Primary    Chronic pain of right knee   So we will hold off orthopedic evaluation, COVID-19 is in a milder formulation.  We did not give Paxlovid, patient has been kept on isolation.  Later on we will consider plain radiograph and orthopedic evaluation may be couple of weeks later.  Still right joint remains slightly swollen although not extremely tender.  COVID-19 is not very usual in a nursing facility is nowadays so it is a exposure either during hospitalization or from the relatives it is difficult to say.  No need to date take any extra interventions, he is doing well and he will be kept on isolation as per routine.     Goals    None           Electronically Signed By: Scott Junior MD   7/16/23  2:03 PM

## 2023-07-16 NOTE — PROGRESS NOTES
Subjective   Patient ID: Jonathan Lira is a 88 y.o. male who is acute skilled care and presents for initial visit for skilled nursing.    88-year-old male patient was admitted because he was having transient speech disturbance, he was admitted because of strokelike symptoms as they do it normally nowadays through emergency room.  Work-up was negative, MRI was negative, previously it has been documented that patient has a some disease in the left carotid artery system.  It is also seen that patient has a some neurodegenerative disorder or parkinsonism features.  Patient was kept in the hospital, MRI was done, nothing was obvious, admitted here for skilled nursing and rehabilitation.  Patient was assessed on the day of assessment and evaluation.  He was sitting comfortably, overnight was uneventful, patient has ambulatory care records and previous evaluations and reports were reviewed.  He has excellent family support, patient will require skilled nursing and rehabilitation.         Review of Systems   Constitutional:  Positive for activity change and fatigue. Negative for diaphoresis.   HENT: Negative.  Negative for congestion and voice change.    Eyes: Negative.  Negative for visual disturbance.   Respiratory: Negative.     Cardiovascular: Negative.  Negative for chest pain, palpitations and leg swelling.   Gastrointestinal: Negative.  Negative for abdominal distention, constipation and diarrhea.   Genitourinary:  Negative for difficulty urinating and hematuria.   Musculoskeletal:  Positive for arthralgias, gait problem and joint swelling.   Skin: Negative.    Neurological:  Positive for speech difficulty and weakness. Negative for dizziness and tremors.   Psychiatric/Behavioral:  Negative for agitation, hallucinations and sleep disturbance.        Objective   /79   Pulse 88     Physical Exam  Constitutional:       Appearance: Normal appearance. He is normal weight.   HENT:      Head: Normocephalic.      Nose:  Nose normal.   Eyes:      Conjunctiva/sclera: Conjunctivae normal.   Cardiovascular:      Rate and Rhythm: Normal rate and regular rhythm.      Pulses: Normal pulses.      Heart sounds: Normal heart sounds.   Pulmonary:      Effort: Pulmonary effort is normal.      Breath sounds: Normal breath sounds.   Abdominal:      General: Abdomen is flat.      Palpations: Abdomen is soft.   Musculoskeletal:         General: Swelling, tenderness and deformity present.      Cervical back: Neck supple.   Skin:     General: Skin is warm and dry.   Neurological:      Mental Status: He is oriented to person, place, and time. Mental status is at baseline.   Psychiatric:         Mood and Affect: Mood normal.         Judgment: Judgment normal.         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  He has been having mild symptoms of cough congestion, he is able to communicate properly, he is able to answer my questions, his speech is not completely normal but it has some substance and fluency.  There is no obvious neurological deficits found.  Remains on scant therapeutics and also only on clopidogrel.  He looks well, physical therapy, Occupational Therapy evaluation will be done, routine care precautions, fall precautions, fulfillment of patient's needs and requirement on a periodic basis and also proper support and assistance will be provided.  Patient is looking well, no obvious evidence of neurological deficit or cognitive impairment, BP readings will be monitored, laboratories will be done as per our routine, at the discharge patient's laboratories were reviewed, discussed with nursing staff and periodic follow-up will be done.     Goals    None

## 2023-07-16 NOTE — ASSESSMENT & PLAN NOTE
Place in quarantine. Droplet precautions maintained during all encounters while in COVID isolation. All services to be provided in room. Monitor oxygen needs.

## 2023-07-21 ENCOUNTER — NURSING HOME VISIT (OUTPATIENT)
Dept: POST ACUTE CARE | Facility: EXTERNAL LOCATION | Age: 88
End: 2023-07-21
Payer: MEDICARE

## 2023-07-21 VITALS
HEIGHT: 71 IN | RESPIRATION RATE: 18 BRPM | DIASTOLIC BLOOD PRESSURE: 80 MMHG | OXYGEN SATURATION: 97 % | WEIGHT: 200 LBS | HEART RATE: 70 BPM | TEMPERATURE: 97.8 F | SYSTOLIC BLOOD PRESSURE: 129 MMHG | BODY MASS INDEX: 28 KG/M2

## 2023-07-21 DIAGNOSIS — I10 ESSENTIAL HYPERTENSION: ICD-10-CM

## 2023-07-21 DIAGNOSIS — U07.1 COVID-19: Primary | ICD-10-CM

## 2023-07-21 DIAGNOSIS — M25.561 CHRONIC PAIN OF RIGHT KNEE: ICD-10-CM

## 2023-07-21 DIAGNOSIS — G89.29 CHRONIC PAIN OF RIGHT KNEE: ICD-10-CM

## 2023-07-21 PROCEDURE — 99308 SBSQ NF CARE LOW MDM 20: CPT | Performed by: PHYSICIAN ASSISTANT

## 2023-07-21 NOTE — PROGRESS NOTES
"7/21/2023  Name: Jonathan Lira  YOB: 1934    Chief complaint: Follow up for COVID    HPI: Patient seen and examined in his room. He describes his breathing as \" good\" . Pulse oximetry 97% on room air. He remains in quarantine. He is afebrile. Sleeping at night. Denies fever, chills, sob, chest pain, palpitation, or loss of appetite.    Review of systems:   ROS negative except were noted in HPI.    Code Status: Full code    /80   Pulse 70   Temp 36.6 °C (97.8 °F)   Resp 18   Ht 1.803 m (5' 11\")   Wt 90.7 kg (200 lb)   SpO2 97%   BMI 27.89 kg/m²         Physical Exam  Constitutional:       General: He is not in acute distress.     Appearance: Normal appearance.   HENT:      Head: Normocephalic.      Nose: Nose normal. No congestion.      Mouth/Throat:      Mouth: Mucous membranes are moist.      Pharynx: Oropharynx is clear.   Eyes:      Extraocular Movements: Extraocular movements intact.      Pupils: Pupils are equal, round, and reactive to light.   Cardiovascular:      Rate and Rhythm: Normal rate and regular rhythm.      Pulses: Normal pulses.      Heart sounds: Normal heart sounds.   Pulmonary:      Effort: Pulmonary effort is normal.      Breath sounds: Normal breath sounds. No wheezing or rales.   Genitourinary:     Comments: Voiding.  Musculoskeletal:      Cervical back: Normal range of motion.      Comments: R knee swelling.    Lymphadenopathy:      Cervical: No cervical adenopathy.   Skin:     General: Skin is warm and dry.      Capillary Refill: Capillary refill takes less than 2 seconds.   Neurological:      General: No focal deficit present.      Mental Status: He is alert and oriented to person, place, and time.   Psychiatric:         Mood and Affect: Mood normal.         Behavior: Behavior normal.     Medications reviewed during visit at facility.  Lisinopril/hydrochlorothiazide 20/12.5 mg po daily  Plavix 75 mg po daily  Nifedical XL 60 mg po daily  Atorvastatin 20 mg po " daily  Tylenol 650 mg po q 4 hours prn  MOM 30 ml po daily prn  Labs reviewed at facility:  Prothrombin Time  REPORTED 2023 10:36  PT Sec   10.8 sec 9.7-13.0    PT INR   1.0     0.     Laboratory Service Report [id number]   Patient Name   JONA RICHMOND  Patient ID   [id number]  Age   88 Y  Gender   M  Order #      Ordering Phys   RAJ ONTIVEROS  Patient Telephone #   N/A     1934  AKA      Client Order #   [id number]   Collection Date and Time   2023 07:25   Print Date and Time   2023 17:53  Account Information   [facility] [place]   [address] [place] [address]     Report Notes                  Test Results   Reference Perform  Unit  Value Site*             CBC and Differential  REPORTED 2023 10:15  White Blood Cell Count   9.22 k/uL 3.70-11.00    RBC   4.96 m/uL 4.20-6.00    Hemoglobin   13.1 g/dL 13.0-17.0    Hematocrit   41.0 % 39.0-51.0    MCV   82.7 fL 80.0-100.0    MCH   26.4 pg 26.0-34.0    MCHC   32.0 g/dL 30.5-36.0    RDW-CV   14.7 % 11.5-15.0    Platelet Count   336 k/uL 150-400    MPV   11.1 fL 9.0-12.7    Neut%   72.8 %    Abs Neut   6.71 k/uL 1.45-7.50    Lymph%   13.4 %    Abs Lymph   1.24 k/uL 1.00-4.00    Mono%   9.7 %    Abs Mono H 0.89 k/uL <0.87    Eosin%   2.5 %    Abs Eosin   0.23 k/uL <0.46    Baso%   0.5 %    Abs Baso   0.05 k/uL <0.11    Immature Gran %%   1.1 %    Abs Immature Gran H 0.10 k/uL <0.10    Absolute nRBC   0.0 /100 WBC    Absolute nRBC   <0.01 k/uL <0.01    Diff Type   Auto               Comp Metabolic Panel  REPORTED 2023 10:28  Protein, Total L 6.1 g/dL 6.3-8.0    Albumin L 3.0 g/dL 3.9-4.9    Calcium, Total   9.1 mg/dL 8.5-10.2    Bilirubin, Total   0.4 mg/dL 0.2-1.3    Alkaline Phosphatase H 139 U/L     AST   16 U/L 14-40    ALT   15 U/L 10-54    Glucose H 100 mg/dL 74-99  Comp Metabolic Panel  REPORTED 2023 10:28  Protein, Total L 6.1 g/dL 6.3-8.0    Albumin L 3.0 g/dL 3.9-4.9    Calcium, Total    9.1 mg/dL 8.5-10.2    Bilirubin, Total   0.4 mg/dL 0.2-1.3    Alkaline Phosphatase H 139 U/L     AST   16 U/L 14-40    ALT   15 U/L 10-54    Glucose H 100 mg/dL 74-99  Assessment/Plan    Problem List Items Addressed This Visit       COVID-19 - Primary     No acute issues. Monitor oxygen needs.          Essential hypertension     Review BP readings. Continue with Lisinopril/hydrochlorothiazide 20/12.5 mg po daily. Nifedical XL 60 mg po ray          Chronic pain of right knee     Schedule appointment with Dr. Shalini BENITEZ on 8-10-23. Tylenol 650 mg po q 4 hours prn.             Time:    Yogesh Perez PA-C

## 2023-07-21 NOTE — LETTER
"Patient: Jonathan Lira  : 1934    Encounter Date: 2023  Name: Jonathan Lira  YOB: 1934    Chief complaint: Follow up for COVID    HPI: Patient seen and examined in his room. He describes his breathing as \" good\" . Pulse oximetry 97% on room air. He remains in quarantine. He is afebrile. Sleeping at night. Denies fever, chills, sob, chest pain, palpitation, or loss of appetite.    Review of systems:   ROS negative except were noted in HPI.    Code Status: Full code    /80   Pulse 70   Temp 36.6 °C (97.8 °F)   Resp 18   Ht 1.803 m (5' 11\")   Wt 90.7 kg (200 lb)   SpO2 97%   BMI 27.89 kg/m²         Physical Exam  Constitutional:       General: He is not in acute distress.     Appearance: Normal appearance.   HENT:      Head: Normocephalic.      Nose: Nose normal. No congestion.      Mouth/Throat:      Mouth: Mucous membranes are moist.      Pharynx: Oropharynx is clear.   Eyes:      Extraocular Movements: Extraocular movements intact.      Pupils: Pupils are equal, round, and reactive to light.   Cardiovascular:      Rate and Rhythm: Normal rate and regular rhythm.      Pulses: Normal pulses.      Heart sounds: Normal heart sounds.   Pulmonary:      Effort: Pulmonary effort is normal.      Breath sounds: Normal breath sounds. No wheezing or rales.   Genitourinary:     Comments: Voiding.  Musculoskeletal:      Cervical back: Normal range of motion.      Comments: R knee swelling.    Lymphadenopathy:      Cervical: No cervical adenopathy.   Skin:     General: Skin is warm and dry.      Capillary Refill: Capillary refill takes less than 2 seconds.   Neurological:      General: No focal deficit present.      Mental Status: He is alert and oriented to person, place, and time.   Psychiatric:         Mood and Affect: Mood normal.         Behavior: Behavior normal.     Medications reviewed during visit at facility.  Lisinopril/hydrochlorothiazide 20/12.5 mg po daily  Plavix 75 " mg po daily  Nifedical XL 60 mg po daily  Atorvastatin 20 mg po daily  Tylenol 650 mg po q 4 hours prn  MOM 30 ml po daily prn  Labs reviewed at facility:  Prothrombin Time  REPORTED 2023 10:36  PT Sec   10.8 sec 9.7-13.0    PT INR   1.0     0.     Laboratory Service Report [id number]   Patient Name   JONA RICHMOND  Patient ID   [id number]  Age   88 Y  Gender   M  Order #      Ordering RAJ Lee  Patient Telephone #   N/A     1934  AKA      Client Order #   [id number]   Collection Date and Time   2023 07:25   Print Date and Time   2023 17:53  Account Information   [facility] [place]   [address] [place] [address]     Report Notes                  Test Results   Reference Perform  Unit  Value Site*             CBC and Differential  REPORTED 2023 10:15  White Blood Cell Count   9.22 k/uL 3.70-11.00    RBC   4.96 m/uL 4.20-6.00    Hemoglobin   13.1 g/dL 13.0-17.0    Hematocrit   41.0 % 39.0-51.0    MCV   82.7 fL 80.0-100.0    MCH   26.4 pg 26.0-34.0    MCHC   32.0 g/dL 30.5-36.0    RDW-CV   14.7 % 11.5-15.0    Platelet Count   336 k/uL 150-400    MPV   11.1 fL 9.0-12.7    Neut%   72.8 %    Abs Neut   6.71 k/uL 1.45-7.50    Lymph%   13.4 %    Abs Lymph   1.24 k/uL 1.00-4.00    Mono%   9.7 %    Abs Mono H 0.89 k/uL <0.87    Eosin%   2.5 %    Abs Eosin   0.23 k/uL <0.46    Baso%   0.5 %    Abs Baso   0.05 k/uL <0.11    Immature Gran %%   1.1 %    Abs Immature Gran H 0.10 k/uL <0.10    Absolute nRBC   0.0 /100 WBC    Absolute nRBC   <0.01 k/uL <0.01    Diff Type   Auto               Comp Metabolic Panel  REPORTED 2023 10:28  Protein, Total L 6.1 g/dL 6.3-8.0    Albumin L 3.0 g/dL 3.9-4.9    Calcium, Total   9.1 mg/dL 8.5-10.2    Bilirubin, Total   0.4 mg/dL 0.2-1.3    Alkaline Phosphatase H 139 U/L     AST   16 U/L 14-40    ALT   15 U/L 10-54    Glucose H 100 mg/dL 74-99  Comp Metabolic Panel  REPORTED 2023 10:28  Protein, Total L 6.1 g/dL  6.3-8.0    Albumin L 3.0 g/dL 3.9-4.9    Calcium, Total   9.1 mg/dL 8.5-10.2    Bilirubin, Total   0.4 mg/dL 0.2-1.3    Alkaline Phosphatase H 139 U/L     AST   16 U/L 14-40    ALT   15 U/L 10-54    Glucose H 100 mg/dL 74-99  Assessment/Plan   Problem List Items Addressed This Visit       COVID-19 - Primary     No acute issues. Monitor oxygen needs.          Essential hypertension     Review BP readings. Continue with Lisinopril/hydrochlorothiazide 20/12.5 mg po daily. Nifedical XL 60 mg po ray          Chronic pain of right knee     Schedule appointment with Dr. Shalini BENITEZ on 8-10-23. Tylenol 650 mg po q 4 hours prn.             Time:    Yogesh Perez PA-C       Electronically Signed By: Yogesh Perez PA-C   7/23/23  6:02 PM

## 2023-07-23 NOTE — ASSESSMENT & PLAN NOTE
Review BP readings. Continue with Lisinopril/hydrochlorothiazide 20/12.5 mg po daily. Nifedical XL 60 mg po ray

## 2023-07-31 ENCOUNTER — DOCUMENTATION (OUTPATIENT)
Dept: PRIMARY CARE | Facility: CLINIC | Age: 88
End: 2023-07-31
Payer: MEDICARE

## 2023-07-31 ENCOUNTER — PATIENT OUTREACH (OUTPATIENT)
Dept: CARE COORDINATION | Facility: CLINIC | Age: 88
End: 2023-07-31
Payer: MEDICARE

## 2023-07-31 NOTE — PROGRESS NOTES
Discharge Facility: [facility]  Discharge Diagnosis: stroke signs/symptoms, weakness  Admission Date: 7/11/23  Discharge Date: 7/28/23    PCP Appointment Date: TBD prefers to call office  Specialist Appointment Date: 8/10/23 Dr Loya  8/15/23 Dr Garsia  [facility] Encounter and Summary: Linked   See discharge assessment below for further details    Engagement  Call Start Time: 1451 (7/31/2023  2:59 PM)    Medications  Medications reviewed with patient/caregiver?: Yes (7/31/2023  2:59 PM)  Is the patient having any side effects they believe may be caused by any medication additions or changes?: No (7/31/2023  2:59 PM)  Does the patient have all medications ordered at discharge?: Yes (7/31/2023  2:59 PM)  Care Management Interventions: No intervention needed (7/31/2023  2:59 PM)  Prescription Comments: no longer on aspirin (7/31/2023  2:59 PM)  Is the patient taking all medications as directed (includes completed medication regime)?: Yes (7/31/2023  2:59 PM)  Care Management Interventions: Provided patient education (7/31/2023  2:59 PM)    Appointments  Does the patient have a primary care provider?: Yes (7/31/2023  2:59 PM)  Care Management Interventions: Educated patient on importance of making appointment (7/31/2023  2:59 PM)  Has the patient kept scheduled appointments due by today?: Yes (7/31/2023  2:59 PM)  Care Management Interventions: Advised to schedule with specialist (7/31/2023  2:59 PM)    Patient Teaching  Does the patient have access to their discharge instructions?: Yes (7/31/2023  2:59 PM)  Care Management Interventions: Reviewed instructions with patient (7/31/2023  2:59 PM)  What is the patient's perception of their health status since discharge?: Improving (7/31/2023  2:59 PM)  Is the patient/caregiver able to teach back the hierarchy of who to call/visit for symptoms/problems? PCP, Specialist, Home Health nurse, Urgent Care, ED, 911: Yes (7/31/2023  2:59 PM)    Wrap Up  Wrap Up  Additional Comments: He is upset due to not being tested for covid in the hospital at all, but went to SNF and was tested and was positive, symptoms may have been due to covid. He does not wish to see Dr Garsia for follow up since he was told no stroke. He is feeling better eating and drinking well. Has appt with Dr Loya 8/10 for R knee pain. (7/31/2023  2:59 PM)  Call End Time: 1500 (7/31/2023  2:59 PM)

## 2023-08-01 DIAGNOSIS — I65.22 OCCLUSION AND STENOSIS OF LEFT CAROTID ARTERY: ICD-10-CM

## 2023-08-01 RX ORDER — CLOPIDOGREL BISULFATE 75 MG/1
75 TABLET ORAL DAILY
Qty: 30 TABLET | Refills: 11 | Status: SHIPPED | OUTPATIENT
Start: 2023-08-01 | End: 2023-11-20 | Stop reason: SDUPTHER

## 2023-08-09 ENCOUNTER — OFFICE VISIT (OUTPATIENT)
Dept: PRIMARY CARE | Facility: CLINIC | Age: 88
End: 2023-08-09
Payer: MEDICARE

## 2023-08-09 VITALS
HEIGHT: 71 IN | TEMPERATURE: 98 F | WEIGHT: 194 LBS | HEART RATE: 68 BPM | BODY MASS INDEX: 27.16 KG/M2 | SYSTOLIC BLOOD PRESSURE: 118 MMHG | DIASTOLIC BLOOD PRESSURE: 60 MMHG

## 2023-08-09 DIAGNOSIS — D64.9 ANEMIA, UNSPECIFIED TYPE: ICD-10-CM

## 2023-08-09 DIAGNOSIS — R47.9 SPEECH DISTURBANCE, UNSPECIFIED TYPE: ICD-10-CM

## 2023-08-09 DIAGNOSIS — I10 ESSENTIAL HYPERTENSION: Primary | ICD-10-CM

## 2023-08-09 DIAGNOSIS — R73.03 PREDIABETES: ICD-10-CM

## 2023-08-09 DIAGNOSIS — I65.22 ARTERIOSCLEROSIS OF CAROTID ARTERY, LEFT: ICD-10-CM

## 2023-08-09 PROCEDURE — 99214 OFFICE O/P EST MOD 30 MIN: CPT | Performed by: INTERNAL MEDICINE

## 2023-08-09 PROCEDURE — 1157F ADVNC CARE PLAN IN RCRD: CPT | Performed by: INTERNAL MEDICINE

## 2023-08-09 PROCEDURE — 1159F MED LIST DOCD IN RCRD: CPT | Performed by: INTERNAL MEDICINE

## 2023-08-09 PROCEDURE — 3078F DIAST BP <80 MM HG: CPT | Performed by: INTERNAL MEDICINE

## 2023-08-09 PROCEDURE — 1036F TOBACCO NON-USER: CPT | Performed by: INTERNAL MEDICINE

## 2023-08-09 PROCEDURE — 1160F RVW MEDS BY RX/DR IN RCRD: CPT | Performed by: INTERNAL MEDICINE

## 2023-08-09 PROCEDURE — 3074F SYST BP LT 130 MM HG: CPT | Performed by: INTERNAL MEDICINE

## 2023-08-09 ASSESSMENT — ENCOUNTER SYMPTOMS
MYALGIAS: 0
AGITATION: 0
CONSTITUTIONAL NEGATIVE: 1
CONFUSION: 0
RESPIRATORY NEGATIVE: 1
CARDIOVASCULAR NEGATIVE: 1
GASTROINTESTINAL NEGATIVE: 1
BACK PAIN: 0
HALLUCINATIONS: 0

## 2023-08-09 NOTE — PROGRESS NOTES
"Subjective   Patient ID: Jonathan Lira is a 89 y.o. male who presents for Follow-up (Patient here for follow up nursing facility for leg weakness and was covid positive at the facility).    Patient is on hospital and nursing home follow-up.  Was admitted about 4 weeks ago to [facility] for strokelike symptoms his MRI was negative for any TIA or stroke he was evaluated by neurology and diagnosed with movement disorder he went to nursing home for rehab and was positive for COVID he had some nasal sinus congestion.  His COVID was mild.  He got released from the nursing home and is feeling better he is using walker and his strength is near baseline and is not having any neurological issues he has history of carotid endarterectomy for carotid stenosis and stroke in the past         Review of Systems   Constitutional: Negative.    HENT: Negative.     Respiratory: Negative.     Cardiovascular: Negative.    Gastrointestinal: Negative.    Genitourinary: Negative.    Musculoskeletal:  Negative for back pain and myalgias.   Neurological:         See HPI   Psychiatric/Behavioral:  Negative for agitation, confusion and hallucinations.    All other systems reviewed and are negative.      Objective   /60   Pulse 68   Temp 36.7 °C (98 °F) (Temporal)   Ht 1.803 m (5' 11\")   Wt 88 kg (194 lb)   BMI 27.06 kg/m²     Physical Exam  Vitals reviewed.   HENT:      Head: Normocephalic.   Eyes:      Conjunctiva/sclera: Conjunctivae normal.      Pupils: Pupils are equal, round, and reactive to light.   Neurological:      General: No focal deficit present.      Mental Status: Mental status is at baseline.      Cranial Nerves: No cranial nerve deficit.   Psychiatric:         Mood and Affect: Mood normal.         Behavior: Behavior normal.         Thought Content: Thought content normal.       Assessment/Plan   Problem List Items Addressed This Visit       Anemia    Arteriosclerosis of carotid artery, left    Essential " hypertension - Primary    Prediabetes    Speech disturbance        Patient speech impediment was transient and has resolved that was one of the main reasons he was hospitalized to [facility].  He will continue antiplatelet treatment along with statins.  Patient can benefit from home/outpatient physical therapy

## 2023-08-15 ENCOUNTER — PATIENT OUTREACH (OUTPATIENT)
Dept: CARE COORDINATION | Facility: CLINIC | Age: 88
End: 2023-08-15
Payer: MEDICARE

## 2023-08-15 NOTE — PROGRESS NOTES
Call regarding appt. with PCP on 8/9/23 after hospitalization.  At time of outreach call the patient feels as if their condition has improved since last visit.  Reviewed the PCP appointment with the pt and addressed any questions or concerns.

## 2023-09-14 ENCOUNTER — PATIENT OUTREACH (OUTPATIENT)
Dept: CARE COORDINATION | Facility: CLINIC | Age: 88
End: 2023-09-14
Payer: MEDICARE

## 2023-10-19 ENCOUNTER — PATIENT OUTREACH (OUTPATIENT)
Dept: CARE COORDINATION | Facility: CLINIC | Age: 88
End: 2023-10-19
Payer: MEDICARE

## 2023-10-19 NOTE — PROGRESS NOTES
90 day call back.  M for pt to assess needs from recent hospitalization.   Contact info provided.

## 2023-10-25 DIAGNOSIS — I10 ESSENTIAL HYPERTENSION: ICD-10-CM

## 2023-10-25 DIAGNOSIS — T46.1X1A: ICD-10-CM

## 2023-10-25 RX ORDER — ATORVASTATIN CALCIUM 20 MG/1
20 TABLET, FILM COATED ORAL NIGHTLY
Qty: 90 TABLET | Refills: 1 | Status: SHIPPED | OUTPATIENT
Start: 2023-10-25

## 2023-10-25 RX ORDER — NIFEDIPINE 60 MG/1
60 TABLET, FILM COATED, EXTENDED RELEASE ORAL
Qty: 90 TABLET | Refills: 2 | Status: SHIPPED | OUTPATIENT
Start: 2023-10-25

## 2023-10-27 DIAGNOSIS — I10 ESSENTIAL HYPERTENSION: ICD-10-CM

## 2023-10-27 RX ORDER — LISINOPRIL AND HYDROCHLOROTHIAZIDE 12.5; 2 MG/1; MG/1
1 TABLET ORAL DAILY
Qty: 90 TABLET | Refills: 1 | Status: SHIPPED | OUTPATIENT
Start: 2023-10-27 | End: 2024-06-05 | Stop reason: SDUPTHER

## 2023-11-06 ENCOUNTER — OFFICE VISIT (OUTPATIENT)
Dept: PRIMARY CARE | Facility: CLINIC | Age: 88
End: 2023-11-06
Payer: MEDICARE

## 2023-11-06 VITALS
WEIGHT: 200 LBS | SYSTOLIC BLOOD PRESSURE: 122 MMHG | DIASTOLIC BLOOD PRESSURE: 68 MMHG | BODY MASS INDEX: 27.89 KG/M2 | TEMPERATURE: 97.8 F | HEART RATE: 64 BPM

## 2023-11-06 DIAGNOSIS — R73.03 PREDIABETES: ICD-10-CM

## 2023-11-06 DIAGNOSIS — I10 ESSENTIAL HYPERTENSION: ICD-10-CM

## 2023-11-06 DIAGNOSIS — E78.2 MIXED HYPERLIPIDEMIA: ICD-10-CM

## 2023-11-06 DIAGNOSIS — J06.9 URTI (ACUTE UPPER RESPIRATORY INFECTION): Primary | ICD-10-CM

## 2023-11-06 PROCEDURE — 1160F RVW MEDS BY RX/DR IN RCRD: CPT | Performed by: INTERNAL MEDICINE

## 2023-11-06 PROCEDURE — 3078F DIAST BP <80 MM HG: CPT | Performed by: INTERNAL MEDICINE

## 2023-11-06 PROCEDURE — 1036F TOBACCO NON-USER: CPT | Performed by: INTERNAL MEDICINE

## 2023-11-06 PROCEDURE — 99213 OFFICE O/P EST LOW 20 MIN: CPT | Performed by: INTERNAL MEDICINE

## 2023-11-06 PROCEDURE — 1159F MED LIST DOCD IN RCRD: CPT | Performed by: INTERNAL MEDICINE

## 2023-11-06 PROCEDURE — 3074F SYST BP LT 130 MM HG: CPT | Performed by: INTERNAL MEDICINE

## 2023-11-06 RX ORDER — AMOXICILLIN AND CLAVULANATE POTASSIUM 875; 125 MG/1; MG/1
875 TABLET, FILM COATED ORAL 2 TIMES DAILY
Qty: 14 TABLET | Refills: 0 | Status: SHIPPED | OUTPATIENT
Start: 2023-11-06 | End: 2023-11-13

## 2023-11-06 ASSESSMENT — ENCOUNTER SYMPTOMS
MUSCULOSKELETAL NEGATIVE: 1
COUGH: 1
FEVER: 0
FATIGUE: 1
PSYCHIATRIC NEGATIVE: 1
GASTROINTESTINAL NEGATIVE: 1
SHORTNESS OF BREATH: 0
CARDIOVASCULAR NEGATIVE: 1

## 2023-11-06 NOTE — PROGRESS NOTES
Subjective   Patient ID: Jonathan Lira is a 89 y.o. male who presents for Follow-up (Pt here for office visit).    HPI   Patient here for office visit.  He has cough and nasal sinus congestion for almost 2 weeks feels tired denies any shortness of breath but his symptoms have progressed  Review of Systems   Constitutional:  Positive for fatigue. Negative for fever.   HENT:  Positive for congestion.    Respiratory:  Positive for cough. Negative for shortness of breath.    Cardiovascular: Negative.    Gastrointestinal: Negative.    Genitourinary: Negative.    Musculoskeletal: Negative.    Psychiatric/Behavioral: Negative.     All other systems reviewed and are negative.      Objective   /68   Pulse 64   Temp 36.6 °C (97.8 °F) (Temporal)   Wt 90.7 kg (200 lb)   BMI 27.89 kg/m²     Physical Exam  Vitals reviewed.   HENT:      Head: Atraumatic.   Eyes:      Pupils: Pupils are equal, round, and reactive to light.   Pulmonary:      Breath sounds: Normal breath sounds.   Musculoskeletal:      Right lower leg: No edema.      Left lower leg: No edema.   Neurological:      General: No focal deficit present.      Mental Status: He is oriented to person, place, and time.   Psychiatric:         Mood and Affect: Mood normal.         Assessment/Plan   Problem List Items Addressed This Visit             ICD-10-CM    Essential hypertension I10    Hyperlipidemia E78.5    Prediabetes R73.03     Other Visit Diagnoses         Codes    URTI (acute upper respiratory infection)    -  Primary J06.9    Relevant Medications    amoxicillin-pot clavulanate (Augmentin) 875-125 mg tablet        Patient symptoms of respiratory infections are not improving for 2 weeks so we will give him a trial off antibiotic if that does not help we will do chest x-ray he will continue his blood pressure medicines advised him to go to ER if he has worsening of symptoms.     Patient was identified as a fall risk. Risk prevention instructions provided.

## 2023-11-20 DIAGNOSIS — I65.22 OCCLUSION AND STENOSIS OF LEFT CAROTID ARTERY: ICD-10-CM

## 2023-11-20 RX ORDER — CLOPIDOGREL BISULFATE 75 MG/1
75 TABLET ORAL DAILY
Qty: 30 TABLET | Refills: 11 | Status: SHIPPED | OUTPATIENT
Start: 2023-11-20 | End: 2024-04-09 | Stop reason: SDUPTHER

## 2024-02-01 ENCOUNTER — HOSPITAL ENCOUNTER (OUTPATIENT)
Dept: RADIOLOGY | Facility: HOSPITAL | Age: 89
Discharge: HOME | End: 2024-02-01
Payer: MEDICARE

## 2024-02-01 ENCOUNTER — OFFICE VISIT (OUTPATIENT)
Dept: PRIMARY CARE | Facility: CLINIC | Age: 89
End: 2024-02-01
Payer: MEDICARE

## 2024-02-01 VITALS
HEART RATE: 68 BPM | BODY MASS INDEX: 28.03 KG/M2 | WEIGHT: 201 LBS | TEMPERATURE: 97.8 F | SYSTOLIC BLOOD PRESSURE: 120 MMHG | DIASTOLIC BLOOD PRESSURE: 66 MMHG

## 2024-02-01 DIAGNOSIS — E11.9 TYPE 2 DIABETES MELLITUS WITHOUT COMPLICATION, WITHOUT LONG-TERM CURRENT USE OF INSULIN (MULTI): ICD-10-CM

## 2024-02-01 DIAGNOSIS — N18.31 STAGE 3A CHRONIC KIDNEY DISEASE (MULTI): ICD-10-CM

## 2024-02-01 DIAGNOSIS — J20.9 ACUTE BRONCHITIS, UNSPECIFIED ORGANISM: ICD-10-CM

## 2024-02-01 DIAGNOSIS — D69.6 THROMBOCYTOPENIA (CMS-HCC): ICD-10-CM

## 2024-02-01 DIAGNOSIS — R73.03 PREDIABETES: ICD-10-CM

## 2024-02-01 DIAGNOSIS — J20.9 ACUTE BRONCHITIS, UNSPECIFIED ORGANISM: Primary | ICD-10-CM

## 2024-02-01 PROCEDURE — 3074F SYST BP LT 130 MM HG: CPT | Performed by: INTERNAL MEDICINE

## 2024-02-01 PROCEDURE — 99213 OFFICE O/P EST LOW 20 MIN: CPT | Performed by: INTERNAL MEDICINE

## 2024-02-01 PROCEDURE — 1157F ADVNC CARE PLAN IN RCRD: CPT | Performed by: INTERNAL MEDICINE

## 2024-02-01 PROCEDURE — 1159F MED LIST DOCD IN RCRD: CPT | Performed by: INTERNAL MEDICINE

## 2024-02-01 PROCEDURE — 1036F TOBACCO NON-USER: CPT | Performed by: INTERNAL MEDICINE

## 2024-02-01 PROCEDURE — 1160F RVW MEDS BY RX/DR IN RCRD: CPT | Performed by: INTERNAL MEDICINE

## 2024-02-01 PROCEDURE — 71046 X-RAY EXAM CHEST 2 VIEWS: CPT

## 2024-02-01 PROCEDURE — 3078F DIAST BP <80 MM HG: CPT | Performed by: INTERNAL MEDICINE

## 2024-02-01 PROCEDURE — 71046 X-RAY EXAM CHEST 2 VIEWS: CPT | Performed by: RADIOLOGY

## 2024-02-01 RX ORDER — PREDNISONE 20 MG/1
40 TABLET ORAL DAILY
Qty: 10 TABLET | Refills: 0 | Status: SHIPPED | OUTPATIENT
Start: 2024-02-01 | End: 2024-02-06

## 2024-02-01 RX ORDER — BENZONATATE 200 MG/1
200 CAPSULE ORAL 3 TIMES DAILY PRN
Qty: 42 CAPSULE | Refills: 0 | Status: SHIPPED | OUTPATIENT
Start: 2024-02-01 | End: 2024-03-02

## 2024-02-01 RX ORDER — ALBUTEROL SULFATE 90 UG/1
2 AEROSOL, METERED RESPIRATORY (INHALATION) EVERY 6 HOURS PRN
Qty: 8 G | Refills: 5 | Status: SHIPPED | OUTPATIENT
Start: 2024-02-01 | End: 2025-01-31

## 2024-02-01 ASSESSMENT — ENCOUNTER SYMPTOMS
SEIZURES: 0
DIZZINESS: 0
COUGH: 1
ENDOCRINE NEGATIVE: 1
FACIAL SWELLING: 0
HEMATOLOGIC/LYMPHATIC NEGATIVE: 1
EYES NEGATIVE: 1
FATIGUE: 1
SHORTNESS OF BREATH: 0

## 2024-02-01 NOTE — PROGRESS NOTES
Subjective   Patient ID: Jonathan Lira is a 89 y.o. male who presents for URI (Pt here for coughing wheezing about 7 days . No other symptoms).    HPI patient here for cough and then having wheezing.  Has had cough for about a week or 10 days no fever no denies shortness of breath his pulse ox is 93% on room air cough is mostly dry sometimes it is productive of phlegm patient denies any headache dizziness denies any nasal or sinus congestion he did have some rhinorrhea initially.    Review of Systems   Constitutional:  Positive for fatigue.   HENT:  Negative for congestion, ear discharge and facial swelling.    Eyes: Negative.    Respiratory:  Positive for cough. Negative for shortness of breath.    Endocrine: Negative.    Genitourinary: Negative.    Neurological:  Negative for dizziness, seizures and syncope.   Hematological: Negative.        Objective   /66   Pulse 68   Temp 36.6 °C (97.8 °F) (Temporal)   Wt 91.2 kg (201 lb)   BMI 28.03 kg/m²     Physical Exam  Vitals reviewed.   Constitutional:       Appearance: Normal appearance. He is not ill-appearing or diaphoretic.   HENT:      Head: Atraumatic.      Nose: Nose normal. No congestion.   Cardiovascular:      Rate and Rhythm: Normal rate and regular rhythm.      Heart sounds: Normal heart sounds.   Pulmonary:      Effort: Pulmonary effort is normal.      Breath sounds: Rhonchi present.   Abdominal:      Palpations: Abdomen is soft.   Neurological:      General: No focal deficit present.      Mental Status: He is alert and oriented to person, place, and time. Mental status is at baseline.   Psychiatric:         Mood and Affect: Mood normal.         Behavior: Behavior normal.         Assessment/Plan   Problem List Items Addressed This Visit             ICD-10-CM    Acute bronchitis - Primary J20.9    Relevant Medications    predniSONE (Deltasone) 20 mg tablet    benzonatate (Tessalon) 200 mg capsule    albuterol (Ventolin HFA) 90 mcg/actuation inhaler     Other Relevant Orders    XR chest 2 views    Prediabetes R73.03    Thrombocytopenia (CMS/[facility]) D69.6    Type 2 diabetes mellitus without complication, without long-term current use of insulin (CMS/[facility]) E11.9    Stage 3a chronic kidney disease (CMS/[facility]) N18.31   Patient will have chest x-ray he will be given bronchodilators    Steroids and benzonatate, based on the results of chest x-ray we will determine if we need to give him any antibiotics.       Patient was identified as a fall risk. Risk prevention instructions provided.

## 2024-02-02 NOTE — PATIENT INSTRUCTIONS

## 2024-02-15 ENCOUNTER — OFFICE VISIT (OUTPATIENT)
Dept: PRIMARY CARE | Facility: CLINIC | Age: 89
End: 2024-02-15
Payer: MEDICARE

## 2024-02-15 VITALS
HEART RATE: 76 BPM | SYSTOLIC BLOOD PRESSURE: 128 MMHG | WEIGHT: 202 LBS | DIASTOLIC BLOOD PRESSURE: 76 MMHG | TEMPERATURE: 97.8 F | BODY MASS INDEX: 28.17 KG/M2

## 2024-02-15 DIAGNOSIS — I10 ESSENTIAL HYPERTENSION: Primary | ICD-10-CM

## 2024-02-15 DIAGNOSIS — I87.2 STASIS DERMATITIS OF BOTH LEGS: ICD-10-CM

## 2024-02-15 DIAGNOSIS — E78.2 MIXED HYPERLIPIDEMIA: ICD-10-CM

## 2024-02-15 DIAGNOSIS — C44.309 SKIN CANCER OF FOREHEAD: ICD-10-CM

## 2024-02-15 DIAGNOSIS — N18.31 STAGE 3A CHRONIC KIDNEY DISEASE (MULTI): ICD-10-CM

## 2024-02-15 DIAGNOSIS — C44.90 SKIN CANCER: ICD-10-CM

## 2024-02-15 DIAGNOSIS — R09.89 BILATERAL CAROTID BRUITS: ICD-10-CM

## 2024-02-15 PROCEDURE — 3078F DIAST BP <80 MM HG: CPT | Performed by: INTERNAL MEDICINE

## 2024-02-15 PROCEDURE — 3074F SYST BP LT 130 MM HG: CPT | Performed by: INTERNAL MEDICINE

## 2024-02-15 PROCEDURE — 1036F TOBACCO NON-USER: CPT | Performed by: INTERNAL MEDICINE

## 2024-02-15 PROCEDURE — 1159F MED LIST DOCD IN RCRD: CPT | Performed by: INTERNAL MEDICINE

## 2024-02-15 PROCEDURE — 1160F RVW MEDS BY RX/DR IN RCRD: CPT | Performed by: INTERNAL MEDICINE

## 2024-02-15 PROCEDURE — 99213 OFFICE O/P EST LOW 20 MIN: CPT | Performed by: INTERNAL MEDICINE

## 2024-02-15 PROCEDURE — 1157F ADVNC CARE PLAN IN RCRD: CPT | Performed by: INTERNAL MEDICINE

## 2024-02-15 ASSESSMENT — ENCOUNTER SYMPTOMS
ENDOCRINE NEGATIVE: 1
RESPIRATORY NEGATIVE: 1
PSYCHIATRIC NEGATIVE: 1
GASTROINTESTINAL NEGATIVE: 1
HEMATOLOGIC/LYMPHATIC NEGATIVE: 1
CONSTITUTIONAL NEGATIVE: 1
MUSCULOSKELETAL NEGATIVE: 1
CARDIOVASCULAR NEGATIVE: 1
NEUROLOGICAL NEGATIVE: 1

## 2024-02-15 NOTE — PROGRESS NOTES
Subjective   Patient ID: Jonathan Lira is a 89 y.o. male who presents for Follow-up (Pt here for bronchitis follow up and chest x ray).    HPI patient here for follow-up chest x-ray no pneumonia patient is feeling much    After steroids and inhalers.    Review of Systems   Constitutional: Negative.    HENT: Negative.     Respiratory: Negative.     Cardiovascular: Negative.    Gastrointestinal: Negative.    Endocrine: Negative.    Genitourinary: Negative.    Musculoskeletal: Negative.    Neurological: Negative.    Hematological: Negative.    Psychiatric/Behavioral: Negative.     All other systems reviewed and are negative.      Objective   /76   Pulse 76   Temp 36.6 °C (97.8 °F) (Temporal)   Wt 91.6 kg (202 lb)   BMI 28.17 kg/m²     Physical Exam  Vitals reviewed.   Constitutional:       Appearance: Normal appearance.   HENT:      Head: Normocephalic.   Eyes:      Pupils: Pupils are equal, round, and reactive to light.   Cardiovascular:      Rate and Rhythm: Normal rate and regular rhythm.   Pulmonary:      Effort: Pulmonary effort is normal.      Breath sounds: Normal breath sounds.   Abdominal:      Palpations: Abdomen is soft. There is no mass.   Musculoskeletal:      Right lower leg: No edema.      Left lower leg: No edema.   Neurological:      Mental Status: He is alert.   Psychiatric:         Mood and Affect: Mood normal.         Behavior: Behavior normal.         Thought Content: Thought content normal.       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  He will continue on nifedipine lisinopril hydrochlorothiazide for hypertension we will continue Plavix with history of stroke and carotid disease will continue  statins for hyperlipidemia.     Patient was identified as a fall risk. Risk prevention instructions provided.Patient was identified as a fall risk. Risk prevention instructions provided.Patient was identified as a fall risk. Risk prevention instructions provided.

## 2024-02-15 NOTE — PATIENT INSTRUCTIONS

## 2024-03-14 ENCOUNTER — APPOINTMENT (OUTPATIENT)
Dept: PRIMARY CARE | Facility: CLINIC | Age: 89
End: 2024-03-14
Payer: MEDICARE

## 2024-04-09 DIAGNOSIS — I65.22 OCCLUSION AND STENOSIS OF LEFT CAROTID ARTERY: ICD-10-CM

## 2024-04-09 RX ORDER — CLOPIDOGREL BISULFATE 75 MG/1
75 TABLET ORAL DAILY
Qty: 90 TABLET | Refills: 1 | Status: SHIPPED | OUTPATIENT
Start: 2024-04-09

## 2024-06-05 DIAGNOSIS — I10 ESSENTIAL HYPERTENSION: ICD-10-CM

## 2024-06-05 RX ORDER — LISINOPRIL AND HYDROCHLOROTHIAZIDE 12.5; 2 MG/1; MG/1
1 TABLET ORAL DAILY
Qty: 90 TABLET | Refills: 0 | Status: SHIPPED | OUTPATIENT
Start: 2024-06-05

## 2024-06-13 ENCOUNTER — APPOINTMENT (OUTPATIENT)
Dept: PRIMARY CARE | Facility: CLINIC | Age: 89
End: 2024-06-13
Payer: MEDICARE

## 2024-06-13 VITALS
HEART RATE: 64 BPM | DIASTOLIC BLOOD PRESSURE: 70 MMHG | TEMPERATURE: 98 F | BODY MASS INDEX: 28.42 KG/M2 | HEIGHT: 71 IN | WEIGHT: 203 LBS | SYSTOLIC BLOOD PRESSURE: 120 MMHG

## 2024-06-13 DIAGNOSIS — Z00.00 MEDICARE ANNUAL WELLNESS VISIT, SUBSEQUENT: Primary | ICD-10-CM

## 2024-06-13 DIAGNOSIS — Z00.00 ROUTINE GENERAL MEDICAL EXAMINATION AT HEALTH CARE FACILITY: ICD-10-CM

## 2024-06-13 DIAGNOSIS — E11.9 TYPE 2 DIABETES MELLITUS WITHOUT COMPLICATION, WITHOUT LONG-TERM CURRENT USE OF INSULIN (MULTI): ICD-10-CM

## 2024-06-13 PROCEDURE — 1159F MED LIST DOCD IN RCRD: CPT | Performed by: INTERNAL MEDICINE

## 2024-06-13 PROCEDURE — G0439 PPPS, SUBSEQ VISIT: HCPCS | Performed by: INTERNAL MEDICINE

## 2024-06-13 PROCEDURE — 99397 PER PM REEVAL EST PAT 65+ YR: CPT | Performed by: INTERNAL MEDICINE

## 2024-06-13 PROCEDURE — 1160F RVW MEDS BY RX/DR IN RCRD: CPT | Performed by: INTERNAL MEDICINE

## 2024-06-13 PROCEDURE — 3078F DIAST BP <80 MM HG: CPT | Performed by: INTERNAL MEDICINE

## 2024-06-13 PROCEDURE — 1157F ADVNC CARE PLAN IN RCRD: CPT | Performed by: INTERNAL MEDICINE

## 2024-06-13 PROCEDURE — 1036F TOBACCO NON-USER: CPT | Performed by: INTERNAL MEDICINE

## 2024-06-13 PROCEDURE — 3074F SYST BP LT 130 MM HG: CPT | Performed by: INTERNAL MEDICINE

## 2024-06-13 ASSESSMENT — ENCOUNTER SYMPTOMS
CONSTITUTIONAL NEGATIVE: 1
DEPRESSION: 0
MUSCULOSKELETAL NEGATIVE: 1
PSYCHIATRIC NEGATIVE: 1
CARDIOVASCULAR NEGATIVE: 1
NEUROLOGICAL NEGATIVE: 1
LOSS OF SENSATION IN FEET: 0
OCCASIONAL FEELINGS OF UNSTEADINESS: 1
RESPIRATORY NEGATIVE: 1
GASTROINTESTINAL NEGATIVE: 1

## 2024-06-13 ASSESSMENT — PATIENT HEALTH QUESTIONNAIRE - PHQ9
1. LITTLE INTEREST OR PLEASURE IN DOING THINGS: NOT AT ALL
SUM OF ALL RESPONSES TO PHQ9 QUESTIONS 1 & 2: 0
2. FEELING DOWN, DEPRESSED OR HOPELESS: NOT AT ALL

## 2024-06-13 NOTE — PROGRESS NOTES
"Subjective   Reason for Visit: Jonathan Lira is an 89 y.o. male here for a Medicare Wellness visit.          Reviewed all medications by prescribing practitioner or clinical pharmacist (such as prescriptions, OTCs, herbal therapies and supplements) and documented in the medical record.    HPI  Doing well denies any chest pain shortness of breath has history of type 2 diabetes hypertension history of stroke and carotid atherosclerosis.    Patient Care Team:  Harman Mason MD as PCP - General  Harman Mason MD as PCP - [employer] PCP     Review of Systems   Constitutional: Negative.    HENT: Negative.     Respiratory: Negative.     Cardiovascular: Negative.    Gastrointestinal: Negative.    Genitourinary: Negative.    Musculoskeletal: Negative.    Neurological: Negative.    Psychiatric/Behavioral: Negative.     All other systems reviewed and are negative.      Objective   Vitals:  /70   Pulse 64   Temp 36.7 °C (98 °F) (Temporal)   Ht 1.803 m (5' 11\")   Wt 92.1 kg (203 lb)   BMI 28.31 kg/m²       Physical Exam  Vitals reviewed.   Constitutional:       Appearance: Normal appearance.   HENT:      Head: Normocephalic.      Nose: Nose normal.   Eyes:      Pupils: Pupils are equal, round, and reactive to light.   Cardiovascular:      Rate and Rhythm: Normal rate and regular rhythm.   Pulmonary:      Effort: Pulmonary effort is normal.      Breath sounds: Normal breath sounds.   Abdominal:      Palpations: Abdomen is soft.   Neurological:      General: No focal deficit present.      Mental Status: He is alert and oriented to person, place, and time.      Cranial Nerves: No cranial nerve deficit.       Assessment/Plan   Problem List Items Addressed This Visit       Type 2 diabetes mellitus without complication, without long-term current use of insulin (Multi)    Medicare annual wellness visit, subsequent - Primary     Other Visit Diagnoses       Routine general medical examination at " health care facility              Continue nifedipine lisinopril hydrochlorothiazide for hypertension continue on Plavix and Lipitor with history of carotid atherosclerosis and stent and history of stroke.  Type 2 diabetes under diet control.

## 2024-08-26 ENCOUNTER — HOSPITAL ENCOUNTER (EMERGENCY)
Facility: HOSPITAL | Age: 89
Discharge: HOME | End: 2024-08-26
Payer: MEDICARE

## 2024-08-26 ENCOUNTER — APPOINTMENT (OUTPATIENT)
Dept: RADIOLOGY | Facility: HOSPITAL | Age: 89
End: 2024-08-26
Payer: MEDICARE

## 2024-08-26 VITALS
SYSTOLIC BLOOD PRESSURE: 114 MMHG | HEART RATE: 78 BPM | HEIGHT: 71 IN | OXYGEN SATURATION: 96 % | WEIGHT: 203 LBS | BODY MASS INDEX: 28.42 KG/M2 | RESPIRATION RATE: 18 BRPM | TEMPERATURE: 97 F | DIASTOLIC BLOOD PRESSURE: 56 MMHG

## 2024-08-26 DIAGNOSIS — S90.32XA CONTUSION OF LEFT FOOT, INITIAL ENCOUNTER: Primary | ICD-10-CM

## 2024-08-26 PROCEDURE — 99283 EMERGENCY DEPT VISIT LOW MDM: CPT

## 2024-08-26 PROCEDURE — 73630 X-RAY EXAM OF FOOT: CPT | Mod: LT

## 2024-08-26 PROCEDURE — 73630 X-RAY EXAM OF FOOT: CPT | Mod: LEFT SIDE

## 2024-08-26 RX ORDER — ACETAMINOPHEN 500 MG
1000 TABLET ORAL EVERY 6 HOURS PRN
Qty: 30 TABLET | Refills: 0 | Status: SHIPPED | OUTPATIENT
Start: 2024-08-26 | End: 2024-09-05

## 2024-08-26 ASSESSMENT — COLUMBIA-SUICIDE SEVERITY RATING SCALE - C-SSRS
2. HAVE YOU ACTUALLY HAD ANY THOUGHTS OF KILLING YOURSELF?: NO
1. IN THE PAST MONTH, HAVE YOU WISHED YOU WERE DEAD OR WISHED YOU COULD GO TO SLEEP AND NOT WAKE UP?: NO
6. HAVE YOU EVER DONE ANYTHING, STARTED TO DO ANYTHING, OR PREPARED TO DO ANYTHING TO END YOUR LIFE?: NO

## 2024-08-26 ASSESSMENT — LIFESTYLE VARIABLES
HAVE PEOPLE ANNOYED YOU BY CRITICIZING YOUR DRINKING: NO
TOTAL SCORE: 0
HAVE YOU EVER FELT YOU SHOULD CUT DOWN ON YOUR DRINKING: NO
EVER FELT BAD OR GUILTY ABOUT YOUR DRINKING: NO
EVER HAD A DRINK FIRST THING IN THE MORNING TO STEADY YOUR NERVES TO GET RID OF A HANGOVER: NO

## 2024-08-26 ASSESSMENT — PAIN - FUNCTIONAL ASSESSMENT: PAIN_FUNCTIONAL_ASSESSMENT: 0-10

## 2024-08-26 ASSESSMENT — PAIN SCALES - GENERAL: PAINLEVEL_OUTOF10: 4

## 2024-08-26 NOTE — ED PROVIDER NOTES
"HPI   Chief Complaint   Patient presents with    Foot Injury     \"Dropped picture frame oin left foot.  On Plavix.  Happened over the weekend.\"       A 89-year-old male patient comes into the emergency department today with complaints of left foot pain.  He states that his children gave him a large picture to frame he said he excellently dropped it on his left foot.  Been having pain ever since.  Rates pain 4 out of 10 on the pain scale pain is worse with ambulation.  For this purpose comes in the emergency department today further evaluation.  Otherwise no other complaints at present time.              Patient History   Past Medical History:   Diagnosis Date    Acute bronchitis due to other specified organisms     Acute bacterial bronchitis    Acute embolism and thrombosis of unspecified deep veins of right lower extremity (Multi)     Right leg DVT    Acute kidney failure, unspecified (CMS-[facility]) 05/26/2021    ELSI (acute kidney injury)    Cellulitis of unspecified part of limb     Cellulitis of leg    Cerebral infarction due to embolism of left carotid artery (Multi) 06/20/2019    Cerebrovascular accident (CVA) due to embolism of left carotid artery    Metabolic syndrome     Metabolic syndrome    Other abnormal glucose     Abnormal blood sugar    Other cerebral infarction due to occlusion or stenosis of small artery (Multi)     Acute lacunar stroke    Other specified abnormal findings of blood chemistry     Elevated serum creatinine    Other specified soft tissue disorders     Leg swelling    Pain in leg, unspecified     Leg pain    Pain in unspecified foot     Foot pain    Personal history of other diseases of the circulatory system     History of hypertension    Personal history of other diseases of the circulatory system     History of carotid artery stenosis    Personal history of other diseases of the musculoskeletal system and connective tissue     History of gout    Personal history of other diseases of the " musculoskeletal system and connective tissue     History of low back pain    Personal history of other endocrine, nutritional and metabolic disease     History of metabolic disorder    Personal history of other endocrine, nutritional and metabolic disease     History of type 2 diabetes mellitus    Personal history of other mental and behavioral disorders     History of anxiety    Personal history of other specified conditions     History of fatigue    Personal history of other specified conditions     History of dizziness    Personal history of transient ischemic attack (TIA), and cerebral infarction without residual deficits     Status post stroke    Pure hyperglyceridemia     Essential hypertriglyceridemia    Synovial cyst of popliteal space ([place]), unspecified knee     Synovial cyst of popliteal space     Past Surgical History:   Procedure Laterality Date    MR HEAD ANGIO WO IV CONTRAST  7/10/2023    MR HEAD ANGIO WO IV CONTRAST 7/10/2023 [facility]    MR NECK ANGIO WO IV CONTRAST  7/10/2023    MR NECK ANGIO WO IV CONTRAST 7/10/2023 [facility]     Family History   Problem Relation Name Age of Onset    No Known Problems Mother      No Known Problems Father       Social History     Tobacco Use    Smoking status: Never    Smokeless tobacco: Never   Vaping Use    Vaping status: Never Used   Substance Use Topics    Alcohol use: Never    Drug use: Never       Physical Exam   ED Triage Vitals [08/26/24 1324]   Temperature Heart Rate Respirations BP   36.1 °C (97 °F) 78 18 114/56      Pulse Ox Temp Source Heart Rate Source Patient Position   96 % Temporal Monitor Sitting      BP Location FiO2 (%)     Right arm --       Physical Exam  Constitutional:       General: He is in acute distress.      Appearance: Normal appearance. He is not ill-appearing.   HENT:      Head: Normocephalic and atraumatic.      Nose: Nose normal.   Eyes:      Extraocular Movements: Extraocular movements intact.      Conjunctiva/sclera: Conjunctivae  normal.      Pupils: Pupils are equal, round, and reactive to light.   Cardiovascular:      Rate and Rhythm: Normal rate and regular rhythm.   Pulmonary:      Effort: Pulmonary effort is normal. No respiratory distress.      Breath sounds: Normal breath sounds. No stridor. No wheezing.   Musculoskeletal:         General: Tenderness (Tenderness palpation over the left plantar foot distally as well as distal dorsal foot) present. Normal range of motion.      Cervical back: Normal range of motion.   Skin:     General: Skin is warm and dry.   Neurological:      General: No focal deficit present.      Mental Status: He is alert and oriented to person, place, and time. Mental status is at baseline.   Psychiatric:         Mood and Affect: Mood normal.           ED Course & MDM   Diagnoses as of 08/26/24 1441   Contusion of left foot, initial encounter                 No data recorded     Tj Coma Scale Score: 15 (08/26/24 1328 : Arti Connors RN)                           Medical Decision Making  A 89-year-old male patient comes into the emergency department today with complaints of left foot pain.  He states that his children gave him a large picture to frame he said he excellently dropped it on his left foot.  Been having pain ever since.  Rates pain 4 out of 10 on the pain scale pain is worse with ambulation.  For this purpose comes in the emergency department today further evaluation.  Otherwise no other complaints at present time.    X-ray of the left foot ordered to rule out any acute fracture or dislocations.    X-rays show osteopenia without any acute osseous abnormality.    I discussed results with the patient.  Will give him p.o. medications for pain for home.  Patient agrees with this plan expressed verbal understanding.  Will give him orthopedic follow-up.    Started the patient    Diagnosis: Left foot contusion      Labs Reviewed - No data to display     XR foot left 3+ views   Final Result   Osteopenia but  no acute osseous abnormality             MACRO:   None        Signed by: Mikayla Cox 8/26/2024 2:17 PM   Dictation workstation:   [id number]          Procedure  Procedures     Brad Peralta PA-C  08/26/24 1443

## 2024-09-18 DIAGNOSIS — I10 ESSENTIAL HYPERTENSION: ICD-10-CM

## 2024-09-18 DIAGNOSIS — T46.1X1A: ICD-10-CM

## 2024-09-18 RX ORDER — NIFEDIPINE 60 MG/1
60 TABLET, FILM COATED, EXTENDED RELEASE ORAL
Qty: 90 TABLET | Refills: 1 | Status: SHIPPED | OUTPATIENT
Start: 2024-09-18

## 2024-09-18 RX ORDER — LISINOPRIL AND HYDROCHLOROTHIAZIDE 12.5; 2 MG/1; MG/1
1 TABLET ORAL DAILY
Qty: 90 TABLET | Refills: 1 | Status: SHIPPED | OUTPATIENT
Start: 2024-09-18

## 2024-09-20 RX ORDER — LISINOPRIL AND HYDROCHLOROTHIAZIDE 12.5; 2 MG/1; MG/1
1 TABLET ORAL DAILY
Qty: 90 TABLET | Refills: 0 | OUTPATIENT
Start: 2024-09-20

## 2024-09-24 ENCOUNTER — APPOINTMENT (OUTPATIENT)
Dept: PRIMARY CARE | Facility: CLINIC | Age: 89
End: 2024-09-24
Payer: MEDICARE

## 2024-09-24 VITALS
TEMPERATURE: 97.9 F | DIASTOLIC BLOOD PRESSURE: 56 MMHG | BODY MASS INDEX: 28.17 KG/M2 | WEIGHT: 202 LBS | HEART RATE: 80 BPM | SYSTOLIC BLOOD PRESSURE: 124 MMHG

## 2024-09-24 DIAGNOSIS — R53.83 FATIGUE, UNSPECIFIED TYPE: ICD-10-CM

## 2024-09-24 DIAGNOSIS — E11.9 TYPE 2 DIABETES MELLITUS WITHOUT COMPLICATION, WITHOUT LONG-TERM CURRENT USE OF INSULIN (MULTI): Primary | ICD-10-CM

## 2024-09-24 DIAGNOSIS — E78.2 MIXED HYPERLIPIDEMIA: ICD-10-CM

## 2024-09-24 DIAGNOSIS — I10 ESSENTIAL HYPERTENSION: ICD-10-CM

## 2024-09-24 PROBLEM — F43.21 GRIEVING: Status: RESOLVED | Noted: 2023-02-15 | Resolved: 2024-09-24

## 2024-09-24 PROCEDURE — 3074F SYST BP LT 130 MM HG: CPT | Performed by: INTERNAL MEDICINE

## 2024-09-24 PROCEDURE — 99213 OFFICE O/P EST LOW 20 MIN: CPT | Performed by: INTERNAL MEDICINE

## 2024-09-24 PROCEDURE — 1036F TOBACCO NON-USER: CPT | Performed by: INTERNAL MEDICINE

## 2024-09-24 PROCEDURE — 1160F RVW MEDS BY RX/DR IN RCRD: CPT | Performed by: INTERNAL MEDICINE

## 2024-09-24 PROCEDURE — 1123F ACP DISCUSS/DSCN MKR DOCD: CPT | Performed by: INTERNAL MEDICINE

## 2024-09-24 PROCEDURE — 1157F ADVNC CARE PLAN IN RCRD: CPT | Performed by: INTERNAL MEDICINE

## 2024-09-24 PROCEDURE — 3078F DIAST BP <80 MM HG: CPT | Performed by: INTERNAL MEDICINE

## 2024-09-24 PROCEDURE — 1159F MED LIST DOCD IN RCRD: CPT | Performed by: INTERNAL MEDICINE

## 2024-09-24 ASSESSMENT — PATIENT HEALTH QUESTIONNAIRE - PHQ9
SUM OF ALL RESPONSES TO PHQ9 QUESTIONS 1 & 2: 0
1. LITTLE INTEREST OR PLEASURE IN DOING THINGS: NOT AT ALL
2. FEELING DOWN, DEPRESSED OR HOPELESS: NOT AT ALL

## 2024-09-24 ASSESSMENT — ENCOUNTER SYMPTOMS
HEMATOLOGIC/LYMPHATIC NEGATIVE: 1
EYES NEGATIVE: 1
RESPIRATORY NEGATIVE: 1
NEUROLOGICAL NEGATIVE: 1
CARDIOVASCULAR NEGATIVE: 1
MUSCULOSKELETAL NEGATIVE: 1
PSYCHIATRIC NEGATIVE: 1
CONSTITUTIONAL NEGATIVE: 1

## 2024-09-24 NOTE — PROGRESS NOTES
Subjective   Patient ID: Jonathan Lira is a 89 y.o. male who presents for Follow-up (Pt here for office visit,no labs).    HPI   Patient here for office visit with he was in the ER for contusion of the left foot which is healing well.  He has history of carotid stenosis s/p endarterectomy and a history of hypertension borderline diabetes mellitus hyperlipidemia.  Review of Systems   Constitutional: Negative.    HENT: Negative.     Eyes: Negative.    Respiratory: Negative.     Cardiovascular: Negative.    Genitourinary: Negative.    Musculoskeletal: Negative.    Neurological: Negative.    Hematological: Negative.    Psychiatric/Behavioral: Negative.     All other systems reviewed and are negative.      Objective   /56   Pulse 80   Temp 36.6 °C (97.9 °F) (Temporal)   Wt 91.6 kg (202 lb)   BMI 28.17 kg/m²     Physical Exam  Vitals reviewed.   Constitutional:       Appearance: Normal appearance.   HENT:      Head: Normocephalic.   Eyes:      Pupils: Pupils are equal, round, and reactive to light.   Cardiovascular:      Rate and Rhythm: Normal rate and regular rhythm.   Pulmonary:      Effort: Pulmonary effort is normal.   Musculoskeletal:      Right lower leg: No edema.      Left lower leg: No edema.   Lymphadenopathy:      Cervical: No cervical adenopathy.   Neurological:      General: No focal deficit present.      Mental Status: He is alert and oriented to person, place, and time. Mental status is at baseline.   Psychiatric:         Mood and Affect: Mood normal.         Behavior: Behavior normal.         Thought Content: Thought content normal.       Assessment/Plan   Problem List Items Addressed This Visit             ICD-10-CM    Essential hypertension I10    Hyperlipidemia E78.5    Relevant Orders    Cholesterol, LDL Direct    Comprehensive Metabolic Panel    Cholesterol, LDL Direct    Type 2 diabetes mellitus without complication, without long-term current use of insulin (Multi) - Primary E11.9     Relevant Orders    Hemoglobin A1C     Other Visit Diagnoses         Codes    Fatigue, unspecified type     R53.83    Relevant Orders    CBC and Auto Differential          Patient advised well blood work.  Continue lisinopril hydrochlorothiazide and nifedipine for hypertension continue clopidogrel with history of stroke and carotid stenosis continue statins for hyperlipidemia.

## 2024-12-07 DIAGNOSIS — I65.22 OCCLUSION AND STENOSIS OF LEFT CAROTID ARTERY: ICD-10-CM

## 2024-12-09 RX ORDER — CLOPIDOGREL BISULFATE 75 MG/1
75 TABLET ORAL DAILY
Qty: 90 TABLET | Refills: 1 | Status: SHIPPED | OUTPATIENT
Start: 2024-12-09

## 2025-01-23 ENCOUNTER — TELEPHONE (OUTPATIENT)
Dept: PRIMARY CARE | Facility: CLINIC | Age: OVER 89
End: 2025-01-23
Payer: MEDICARE

## 2025-01-24 ENCOUNTER — APPOINTMENT (OUTPATIENT)
Dept: PRIMARY CARE | Facility: CLINIC | Age: OVER 89
End: 2025-01-24
Payer: MEDICARE

## 2025-02-24 ENCOUNTER — TELEPHONE (OUTPATIENT)
Dept: PRIMARY CARE | Facility: CLINIC | Age: OVER 89
End: 2025-02-24
Payer: MEDICARE

## 2025-02-25 ENCOUNTER — APPOINTMENT (OUTPATIENT)
Dept: PRIMARY CARE | Facility: CLINIC | Age: OVER 89
End: 2025-02-25
Payer: MEDICARE

## 2025-02-25 VITALS
WEIGHT: 202 LBS | BODY MASS INDEX: 28.28 KG/M2 | HEIGHT: 71 IN | DIASTOLIC BLOOD PRESSURE: 74 MMHG | SYSTOLIC BLOOD PRESSURE: 122 MMHG | TEMPERATURE: 97.8 F | HEART RATE: 64 BPM

## 2025-02-25 DIAGNOSIS — R09.89 BILATERAL CAROTID BRUITS: ICD-10-CM

## 2025-02-25 DIAGNOSIS — N18.31 STAGE 3A CHRONIC KIDNEY DISEASE (MULTI): ICD-10-CM

## 2025-02-25 DIAGNOSIS — Z00.00 MEDICARE ANNUAL WELLNESS VISIT, SUBSEQUENT: Primary | ICD-10-CM

## 2025-02-25 DIAGNOSIS — Z00.00 ROUTINE GENERAL MEDICAL EXAMINATION AT HEALTH CARE FACILITY: ICD-10-CM

## 2025-02-25 DIAGNOSIS — E11.9 TYPE 2 DIABETES MELLITUS WITHOUT COMPLICATION, WITHOUT LONG-TERM CURRENT USE OF INSULIN (MULTI): ICD-10-CM

## 2025-02-25 PROCEDURE — 1036F TOBACCO NON-USER: CPT | Performed by: INTERNAL MEDICINE

## 2025-02-25 PROCEDURE — 1157F ADVNC CARE PLAN IN RCRD: CPT | Performed by: INTERNAL MEDICINE

## 2025-02-25 PROCEDURE — 3078F DIAST BP <80 MM HG: CPT | Performed by: INTERNAL MEDICINE

## 2025-02-25 PROCEDURE — 1159F MED LIST DOCD IN RCRD: CPT | Performed by: INTERNAL MEDICINE

## 2025-02-25 PROCEDURE — 1160F RVW MEDS BY RX/DR IN RCRD: CPT | Performed by: INTERNAL MEDICINE

## 2025-02-25 PROCEDURE — G0439 PPPS, SUBSEQ VISIT: HCPCS | Performed by: INTERNAL MEDICINE

## 2025-02-25 PROCEDURE — 1123F ACP DISCUSS/DSCN MKR DOCD: CPT | Performed by: INTERNAL MEDICINE

## 2025-02-25 PROCEDURE — 3074F SYST BP LT 130 MM HG: CPT | Performed by: INTERNAL MEDICINE

## 2025-02-25 ASSESSMENT — ENCOUNTER SYMPTOMS
RESPIRATORY NEGATIVE: 1
HEMATOLOGIC/LYMPHATIC NEGATIVE: 1
CARDIOVASCULAR NEGATIVE: 1
NEUROLOGICAL NEGATIVE: 1
PSYCHIATRIC NEGATIVE: 1
ALLERGIC/IMMUNOLOGIC NEGATIVE: 1
GASTROINTESTINAL NEGATIVE: 1
ENDOCRINE NEGATIVE: 1
CONSTITUTIONAL NEGATIVE: 1
EYES NEGATIVE: 1

## 2025-02-25 ASSESSMENT — PATIENT HEALTH QUESTIONNAIRE - PHQ9
1. LITTLE INTEREST OR PLEASURE IN DOING THINGS: NOT AT ALL
2. FEELING DOWN, DEPRESSED OR HOPELESS: NOT AT ALL
SUM OF ALL RESPONSES TO PHQ9 QUESTIONS 1 & 2: 0

## 2025-02-25 NOTE — PROGRESS NOTES
" Subjective   Reason for Visit: Jonathan Lira is an 89 y.o. male here for a Medicare Wellness visit.          Reviewed all medications by prescribing practitioner or clinical pharmacist (such as prescriptions, OTCs, herbal therapies and supplements) and documented in the medical record.    HPI  Patient here for wellness exam he has a history of hypertension diabetes history of DVT carotid stenosis and also history of carotid endarterectomy in the past he feels well overall his gait has become slower with age but denies any falls    Patient Care Team:  Harman Mason MD as PCP - General  Harman Mason MD as PCP - [employer] PCP     Review of Systems   Constitutional: Negative.    HENT: Negative.     Eyes: Negative.    Respiratory: Negative.     Cardiovascular: Negative.    Gastrointestinal: Negative.    Endocrine: Negative.    Genitourinary: Negative.    Allergic/Immunologic: Negative.    Neurological: Negative.    Hematological: Negative.    Psychiatric/Behavioral: Negative.     All other systems reviewed and are negative.      Objective   Vitals:  /74   Pulse 64   Temp 36.6 °C (97.8 °F) (Temporal)   Ht 1.803 m (5' 11\")   Wt 91.6 kg (202 lb)   BMI 28.17 kg/m²       Physical Exam  Vitals reviewed.   Constitutional:       Appearance: Normal appearance.   HENT:      Head: Normocephalic and atraumatic.   Neck:      Vascular: No carotid bruit.   Cardiovascular:      Rate and Rhythm: Normal rate and regular rhythm.   Pulmonary:      Effort: Pulmonary effort is normal.      Breath sounds: Normal breath sounds.   Abdominal:      General: Abdomen is flat.      Palpations: Abdomen is soft.   Musculoskeletal:      Right lower leg: No edema.      Left lower leg: No edema.   Neurological:      General: No focal deficit present.      Mental Status: He is alert and oriented to person, place, and time. Mental status is at baseline.       Assessment & Plan  Routine general medical " examination at [facility] facility         Type 2 diabetes mellitus without complication, without long-term current use of insulin (Multi)         Stage 3a chronic kidney disease (Multi)         Medicare annual wellness visit, subsequent         Bilateral carotid bruits    Orders:    Vascular US Carotid Artery Duplex Bilateral; Future  Patient due for blood work we will order that and rest of his wellness exam unremarkable.  Will also check carotid duplex to rule out recurrent carotid stenosis and also look at his right carotid.  He has decreased pulsations.  Patient will continue with clopidogrel with history of stroke along with atorvastatin.  He will continue nifedipine lisinopril hydrochlorothiazide for hypertension.

## 2025-03-18 ENCOUNTER — HOSPITAL ENCOUNTER (OUTPATIENT)
Dept: RADIOLOGY | Facility: HOSPITAL | Age: OVER 89
Discharge: HOME | End: 2025-03-18
Payer: COMMERCIAL

## 2025-03-18 DIAGNOSIS — R09.89 BILATERAL CAROTID BRUITS: ICD-10-CM

## 2025-03-18 PROCEDURE — 93880 EXTRACRANIAL BILAT STUDY: CPT | Performed by: RADIOLOGY

## 2025-03-18 PROCEDURE — 93880 EXTRACRANIAL BILAT STUDY: CPT

## 2025-03-26 LAB
ALBUMIN SERPL-MCNC: 4.2 G/DL (ref 3.6–5.1)
ALP SERPL-CCNC: 147 U/L (ref 35–144)
ALT SERPL-CCNC: 10 U/L (ref 9–46)
ANION GAP SERPL CALCULATED.4IONS-SCNC: 9 MMOL/L (CALC) (ref 7–17)
AST SERPL-CCNC: 13 U/L (ref 10–35)
BASOPHILS # BLD AUTO: 47 CELLS/UL (ref 0–200)
BASOPHILS NFR BLD AUTO: 0.5 %
BILIRUB SERPL-MCNC: 0.4 MG/DL (ref 0.2–1.2)
BUN SERPL-MCNC: 19 MG/DL (ref 7–25)
CALCIUM SERPL-MCNC: 9.4 MG/DL (ref 8.6–10.3)
CHLORIDE SERPL-SCNC: 105 MMOL/L (ref 98–110)
CO2 SERPL-SCNC: 26 MMOL/L (ref 20–32)
CREAT SERPL-MCNC: 1.11 MG/DL (ref 0.7–1.22)
EGFRCR SERPLBLD CKD-EPI 2021: 63 ML/MIN/1.73M2
EOSINOPHIL # BLD AUTO: 307 CELLS/UL (ref 15–500)
EOSINOPHIL NFR BLD AUTO: 3.3 %
ERYTHROCYTE [DISTWIDTH] IN BLOOD BY AUTOMATED COUNT: 13.9 % (ref 11–15)
EST. AVERAGE GLUCOSE BLD GHB EST-MCNC: 134 MG/DL
EST. AVERAGE GLUCOSE BLD GHB EST-SCNC: 7.4 MMOL/L
GLUCOSE SERPL-MCNC: 119 MG/DL (ref 65–99)
HBA1C MFR BLD: 6.3 % OF TOTAL HGB
HCT VFR BLD AUTO: 45.4 % (ref 38.5–50)
HGB BLD-MCNC: 14.3 G/DL (ref 13.2–17.1)
LDLC SERPL DIRECT ASSAY-MCNC: 102 MG/DL
LYMPHOCYTES # BLD AUTO: 1432 CELLS/UL (ref 850–3900)
LYMPHOCYTES NFR BLD AUTO: 15.4 %
MCH RBC QN AUTO: 27.5 PG (ref 27–33)
MCHC RBC AUTO-ENTMCNC: 31.5 G/DL (ref 32–36)
MCV RBC AUTO: 87.3 FL (ref 80–100)
MONOCYTES # BLD AUTO: 791 CELLS/UL (ref 200–950)
MONOCYTES NFR BLD AUTO: 8.5 %
NEUTROPHILS # BLD AUTO: 6724 CELLS/UL (ref 1500–7800)
NEUTROPHILS NFR BLD AUTO: 72.3 %
PLATELET # BLD AUTO: 241 THOUSAND/UL (ref 140–400)
PMV BLD REES-ECKER: 11.3 FL (ref 7.5–12.5)
POTASSIUM SERPL-SCNC: 4.6 MMOL/L (ref 3.5–5.3)
PROT SERPL-MCNC: 6.7 G/DL (ref 6.1–8.1)
RBC # BLD AUTO: 5.2 MILLION/UL (ref 4.2–5.8)
SODIUM SERPL-SCNC: 140 MMOL/L (ref 135–146)
WBC # BLD AUTO: 9.3 THOUSAND/UL (ref 3.8–10.8)

## 2025-04-10 ENCOUNTER — APPOINTMENT (OUTPATIENT)
Dept: PRIMARY CARE | Facility: CLINIC | Age: OVER 89
End: 2025-04-10
Payer: COMMERCIAL

## 2025-04-17 ENCOUNTER — APPOINTMENT (OUTPATIENT)
Dept: PRIMARY CARE | Facility: CLINIC | Age: OVER 89
End: 2025-04-17
Payer: MEDICARE

## 2025-04-17 VITALS
HEIGHT: 71 IN | WEIGHT: 197 LBS | DIASTOLIC BLOOD PRESSURE: 62 MMHG | TEMPERATURE: 97.7 F | HEART RATE: 81 BPM | BODY MASS INDEX: 27.58 KG/M2 | OXYGEN SATURATION: 91 % | SYSTOLIC BLOOD PRESSURE: 110 MMHG

## 2025-04-17 DIAGNOSIS — E78.2 MIXED HYPERLIPIDEMIA: ICD-10-CM

## 2025-04-17 DIAGNOSIS — I10 ESSENTIAL HYPERTENSION: Primary | ICD-10-CM

## 2025-04-17 DIAGNOSIS — E11.9 TYPE 2 DIABETES MELLITUS WITHOUT COMPLICATION, WITHOUT LONG-TERM CURRENT USE OF INSULIN: ICD-10-CM

## 2025-04-17 PROCEDURE — 99213 OFFICE O/P EST LOW 20 MIN: CPT | Performed by: INTERNAL MEDICINE

## 2025-04-17 PROCEDURE — 1157F ADVNC CARE PLAN IN RCRD: CPT | Performed by: INTERNAL MEDICINE

## 2025-04-17 PROCEDURE — 1160F RVW MEDS BY RX/DR IN RCRD: CPT | Performed by: INTERNAL MEDICINE

## 2025-04-17 PROCEDURE — 1159F MED LIST DOCD IN RCRD: CPT | Performed by: INTERNAL MEDICINE

## 2025-04-17 PROCEDURE — 1123F ACP DISCUSS/DSCN MKR DOCD: CPT | Performed by: INTERNAL MEDICINE

## 2025-04-17 PROCEDURE — 3074F SYST BP LT 130 MM HG: CPT | Performed by: INTERNAL MEDICINE

## 2025-04-17 PROCEDURE — 3078F DIAST BP <80 MM HG: CPT | Performed by: INTERNAL MEDICINE

## 2025-04-17 PROCEDURE — G2211 COMPLEX E/M VISIT ADD ON: HCPCS | Performed by: INTERNAL MEDICINE

## 2025-04-17 ASSESSMENT — ENCOUNTER SYMPTOMS
HEMATOLOGIC/LYMPHATIC NEGATIVE: 1
MUSCULOSKELETAL NEGATIVE: 1
CARDIOVASCULAR NEGATIVE: 1
NEUROLOGICAL NEGATIVE: 1
CONSTITUTIONAL NEGATIVE: 1
PSYCHIATRIC NEGATIVE: 1
RESPIRATORY NEGATIVE: 1
GASTROINTESTINAL NEGATIVE: 1
EYES NEGATIVE: 1
ALLERGIC/IMMUNOLOGIC NEGATIVE: 1
ENDOCRINE NEGATIVE: 1

## 2025-04-17 NOTE — PROGRESS NOTES
"Subjective   Patient ID: Jonathan Lira is a 89 y.o. male who presents for Follow-up (Patient here for 2 weeks follow up).    HPI   Patient here for test results his A1c is 6.3.  Carotids less than 50% bilateral.  He has history of carotid endarterectomy and had a stroke prior to that so he is on Plavix  Review of Systems   Constitutional: Negative.    HENT: Negative.     Eyes: Negative.    Respiratory: Negative.     Cardiovascular: Negative.    Gastrointestinal: Negative.    Endocrine: Negative.    Genitourinary: Negative.    Musculoskeletal: Negative.    Allergic/Immunologic: Negative.    Neurological: Negative.    Hematological: Negative.    Psychiatric/Behavioral: Negative.     All other systems reviewed and are negative.      Objective   /62 (BP Location: Left arm, Patient Position: Sitting, BP Cuff Size: Small adult)   Pulse 81   Temp 36.5 °C (97.7 °F) (Temporal)   Ht 1.803 m (5' 11\")   Wt 89.4 kg (197 lb)   SpO2 91%   BMI 27.48 kg/m²     Physical Exam  Vitals reviewed.   Constitutional:       Appearance: Normal appearance.   HENT:      Head: Normocephalic and atraumatic.   Neck:      Vascular: No carotid bruit.   Cardiovascular:      Rate and Rhythm: Normal rate and regular rhythm.      Pulses: Normal pulses.      Heart sounds: Normal heart sounds.   Pulmonary:      Effort: Pulmonary effort is normal.      Breath sounds: Normal breath sounds.   Musculoskeletal:      Right lower leg: No edema.      Left lower leg: No edema.   Neurological:      General: No focal deficit present.      Mental Status: He is alert and oriented to person, place, and time. Mental status is at baseline.         Assessment/Plan   Problem List Items Addressed This Visit           ICD-10-CM    Essential hypertension - Primary I10    Hyperlipidemia E78.5    Type 2 diabetes mellitus without complication, without long-term current use of insulin E11.9     Type 2 diabetes diet controlled.  Patient will continue clopidogrel and " Lipitor for with history of stroke and carotid intervention in the past patient will continue lisinopril hydrochlorothiazide and nifedipine for hypertension.

## 2025-04-29 DIAGNOSIS — I10 ESSENTIAL HYPERTENSION: ICD-10-CM

## 2025-04-29 RX ORDER — LISINOPRIL AND HYDROCHLOROTHIAZIDE 12.5; 2 MG/1; MG/1
1 TABLET ORAL DAILY
Qty: 90 TABLET | Refills: 1 | Status: SHIPPED | OUTPATIENT
Start: 2025-04-29

## 2025-05-21 DIAGNOSIS — I10 ESSENTIAL HYPERTENSION: ICD-10-CM

## 2025-05-21 RX ORDER — NIFEDIPINE 60 MG/1
60 TABLET, FILM COATED, EXTENDED RELEASE ORAL DAILY
Qty: 90 TABLET | Refills: 1 | Status: SHIPPED | OUTPATIENT
Start: 2025-05-21

## 2025-05-21 RX ORDER — NIFEDIPINE 60 MG/1
1 TABLET, FILM COATED, EXTENDED RELEASE ORAL DAILY
COMMUNITY
End: 2025-05-21 | Stop reason: SDUPTHER

## 2025-06-02 ENCOUNTER — TELEPHONE (OUTPATIENT)
Dept: PRIMARY CARE | Facility: CLINIC | Age: OVER 89
End: 2025-06-02
Payer: COMMERCIAL

## 2025-06-02 DIAGNOSIS — R05.9 COUGH, UNSPECIFIED TYPE: ICD-10-CM

## 2025-06-02 RX ORDER — AMOXICILLIN AND CLAVULANATE POTASSIUM 875; 125 MG/1; MG/1
875 TABLET, FILM COATED ORAL 2 TIMES DAILY
Qty: 20 TABLET | Refills: 0 | Status: SHIPPED | OUTPATIENT
Start: 2025-06-02 | End: 2025-06-12

## 2025-06-13 ENCOUNTER — APPOINTMENT (OUTPATIENT)
Dept: PRIMARY CARE | Facility: CLINIC | Age: OVER 89
End: 2025-06-13
Payer: MEDICARE

## 2025-08-13 ENCOUNTER — APPOINTMENT (OUTPATIENT)
Dept: PRIMARY CARE | Facility: CLINIC | Age: OVER 89
End: 2025-08-13
Payer: MEDICARE

## 2025-08-13 VITALS
OXYGEN SATURATION: 98 % | WEIGHT: 193 LBS | SYSTOLIC BLOOD PRESSURE: 112 MMHG | HEART RATE: 69 BPM | BODY MASS INDEX: 27.02 KG/M2 | DIASTOLIC BLOOD PRESSURE: 68 MMHG | TEMPERATURE: 97.5 F | HEIGHT: 71 IN

## 2025-08-13 DIAGNOSIS — Z74.09 IMPAIRED FUNCTIONAL MOBILITY, BALANCE, GAIT, AND ENDURANCE: ICD-10-CM

## 2025-08-13 DIAGNOSIS — E78.2 MIXED HYPERLIPIDEMIA: ICD-10-CM

## 2025-08-13 DIAGNOSIS — E11.9 TYPE 2 DIABETES MELLITUS WITHOUT COMPLICATION, WITHOUT LONG-TERM CURRENT USE OF INSULIN: Primary | ICD-10-CM

## 2025-08-13 DIAGNOSIS — I10 ESSENTIAL HYPERTENSION: ICD-10-CM

## 2025-08-13 PROCEDURE — 3074F SYST BP LT 130 MM HG: CPT | Performed by: INTERNAL MEDICINE

## 2025-08-13 PROCEDURE — 3078F DIAST BP <80 MM HG: CPT | Performed by: INTERNAL MEDICINE

## 2025-08-13 PROCEDURE — 1036F TOBACCO NON-USER: CPT | Performed by: INTERNAL MEDICINE

## 2025-08-13 PROCEDURE — G2211 COMPLEX E/M VISIT ADD ON: HCPCS | Performed by: INTERNAL MEDICINE

## 2025-08-13 PROCEDURE — 1159F MED LIST DOCD IN RCRD: CPT | Performed by: INTERNAL MEDICINE

## 2025-08-13 PROCEDURE — 99214 OFFICE O/P EST MOD 30 MIN: CPT | Performed by: INTERNAL MEDICINE

## 2025-08-13 ASSESSMENT — ENCOUNTER SYMPTOMS
EYES NEGATIVE: 1
RESPIRATORY NEGATIVE: 1
GASTROINTESTINAL NEGATIVE: 1
PSYCHIATRIC NEGATIVE: 1
ENDOCRINE NEGATIVE: 1
CARDIOVASCULAR NEGATIVE: 1
NEUROLOGICAL NEGATIVE: 1
HEMATOLOGIC/LYMPHATIC NEGATIVE: 1

## 2025-11-13 ENCOUNTER — APPOINTMENT (OUTPATIENT)
Dept: PRIMARY CARE | Facility: CLINIC | Age: OVER 89
End: 2025-11-13
Payer: COMMERCIAL